# Patient Record
Sex: FEMALE | Race: WHITE | HISPANIC OR LATINO | Employment: FULL TIME | URBAN - METROPOLITAN AREA
[De-identification: names, ages, dates, MRNs, and addresses within clinical notes are randomized per-mention and may not be internally consistent; named-entity substitution may affect disease eponyms.]

---

## 2022-06-29 PROBLEM — M17.9 OSTEOARTHRITIS OF KNEE: Status: ACTIVE | Noted: 2022-06-29

## 2024-01-04 DIAGNOSIS — Z00.00 WELLNESS EXAMINATION: ICD-10-CM

## 2024-01-04 DIAGNOSIS — E53.8 B12 DEFICIENCY: ICD-10-CM

## 2024-01-04 DIAGNOSIS — E03.9 HYPOTHYROIDISM, UNSPECIFIED TYPE: ICD-10-CM

## 2024-01-04 DIAGNOSIS — I10 PRIMARY HYPERTENSION: Primary | ICD-10-CM

## 2024-01-05 ENCOUNTER — HOSPITAL ENCOUNTER (OUTPATIENT)
Dept: CARDIOLOGY | Facility: HOSPITAL | Age: 69
Discharge: HOME | End: 2024-01-05
Attending: INTERNAL MEDICINE
Payer: MEDICARE

## 2024-01-05 DIAGNOSIS — G45.3 AMAUROSIS FUGAX: ICD-10-CM

## 2024-01-05 LAB
AV INDEX (PROSTH): 0.55
AV MEAN GRADIENT: 5 MMHG
AV PEAK GRADIENT: 11 MMHG
AV VALVE AREA BY VELOCITY RATIO: 1.63 CM²
AV VALVE AREA: 1.57 CM²
AV VELOCITY RATIO: 0.58
CV ECHO LV RWT: 0.58 CM
DOP CALC AO PEAK VEL: 1.63 M/S
DOP CALC AO VTI: 38.2 CM
DOP CALC LVOT AREA: 2.8 CM2
DOP CALC LVOT DIAMETER: 1.9 CM
DOP CALC LVOT PEAK VEL: 0.94 M/S
DOP CALC LVOT STROKE VOLUME: 59.79 CM3
DOP CALC MV VTI: 34.5 CM
DOP CALCLVOT PEAK VEL VTI: 21.1 CM
E/A RATIO: 2.24
E/E' RATIO: 9.18 M/S
ECHO LV POSTERIOR WALL: 1.18 CM (ref 0.6–1.1)
FRACTIONAL SHORTENING: 29 % (ref 28–44)
INTERVENTRICULAR SEPTUM: 0.99 CM (ref 0.6–1.1)
LEFT ATRIUM SIZE: 4.2 CM
LEFT CCA DIST DIAS: 17 CM/S
LEFT CCA DIST SYS: 80 CM/S
LEFT CCA PROX DIAS: 22 CM/S
LEFT CCA PROX SYS: 140 CM/S
LEFT ECA DIAS: 7 CM/S
LEFT ECA SYS: 75 CM/S
LEFT ICA DIST DIAS: 28 CM/S
LEFT ICA DIST SYS: 80 CM/S
LEFT ICA MID DIAS: 27 CM/S
LEFT ICA MID SYS: 73 CM/S
LEFT ICA PROX DIAS: 22 CM/S
LEFT ICA PROX SYS: 69 CM/S
LEFT INTERNAL DIMENSION IN SYSTOLE: 2.9 CM (ref 2.1–4)
LEFT VENTRICLE DIASTOLIC VOLUME: 73.4 ML
LEFT VENTRICLE SYSTOLIC VOLUME: 32.2 ML
LEFT VENTRICULAR INTERNAL DIMENSION IN DIASTOLE: 4.08 CM (ref 3.5–6)
LEFT VENTRICULAR MASS: 147.22 G
LEFT VERTEBRAL DIAS: 13 CM/S
LEFT VERTEBRAL SYS: 46 CM/S
LV LATERAL E/E' RATIO: 8.42 M/S
LV SEPTAL E/E' RATIO: 10.1 M/S
LVOT MG: 2 MMHG
LVOT MV: 0.66 CM/S
MV MEAN GRADIENT: 2 MMHG
MV PEAK A VEL: 0.45 M/S
MV PEAK E VEL: 1.01 M/S
MV PEAK GRADIENT: 4 MMHG
MV STENOSIS PRESSURE HALF TIME: 77 MS
MV VALVE AREA BY CONTINUITY EQUATION: 1.73 CM2
MV VALVE AREA P 1/2 METHOD: 2.86 CM2
OHS CV CAROTID RIGHT ICA EDV HIGHEST: 31
OHS CV CAROTID ULTRASOUND LEFT ICA/CCA RATIO: 1
OHS CV CAROTID ULTRASOUND RIGHT ICA/CCA RATIO: 1.13
OHS CV PV CAROTID LEFT HIGHEST CCA: 140
OHS CV PV CAROTID LEFT HIGHEST ICA: 80
OHS CV PV CAROTID RIGHT HIGHEST CCA: 120
OHS CV PV CAROTID RIGHT HIGHEST ICA: 99
OHS CV US CAROTID LEFT HIGHEST EDV: 28
OHS LV EJECTION FRACTION SIMPSONS BIPLANE MOD: 64 %
PISA MRMAX VEL: 4.67 M/S
PISA TR MAX VEL: 2.28 M/S
PV PEAK GRADIENT: 4 MMHG
PV PEAK VELOCITY: 0.95 M/S
RA PRESSURE ESTIMATED: 3 MMHG
RIGHT CCA DIST DIAS: 19 CM/S
RIGHT CCA DIST SYS: 88 CM/S
RIGHT CCA PROX DIAS: 19 CM/S
RIGHT CCA PROX SYS: 120 CM/S
RIGHT ECA DIAS: 10 CM/S
RIGHT ECA SYS: 75 CM/S
RIGHT ICA DIST DIAS: 31 CM/S
RIGHT ICA DIST SYS: 99 CM/S
RIGHT ICA MID DIAS: 18 CM/S
RIGHT ICA MID SYS: 70 CM/S
RIGHT ICA PROX DIAS: 23 CM/S
RIGHT ICA PROX SYS: 89 CM/S
RIGHT VENTRICULAR END-DIASTOLIC DIMENSION: 2.41 CM
RIGHT VERTEBRAL DIAS: 0 CM/S
RIGHT VERTEBRAL SYS: 50 CM/S
RV TB RVSP: 5 MMHG
TDI LATERAL: 0.12 M/S
TDI SEPTAL: 0.1 M/S
TDI: 0.11 M/S
TR MAX PG: 21 MMHG
TRICUSPID ANNULAR PLANE SYSTOLIC EXCURSION: 2.19 CM
TV REST PULMONARY ARTERY PRESSURE: 24 MMHG

## 2024-01-05 PROCEDURE — 93880 EXTRACRANIAL BILAT STUDY: CPT | Mod: 26,,, | Performed by: SPECIALIST

## 2024-01-05 PROCEDURE — 93306 TTE W/DOPPLER COMPLETE: CPT | Mod: 26,,, | Performed by: INTERNAL MEDICINE

## 2024-01-05 PROCEDURE — 93880 EXTRACRANIAL BILAT STUDY: CPT

## 2024-01-05 PROCEDURE — 93306 TTE W/DOPPLER COMPLETE: CPT

## 2024-01-09 ENCOUNTER — TELEPHONE (OUTPATIENT)
Dept: INTERNAL MEDICINE | Facility: CLINIC | Age: 69
End: 2024-01-09
Payer: MEDICARE

## 2024-01-09 NOTE — TELEPHONE ENCOUNTER
"----- Message from Rissa Garrido LPN sent at 1/8/2024  7:44 AM CST -----  Regarding: Dr. Roberts 01/16/2024 1:00pm Wellness  Are there any outstanding tasks in chart? No, but needs FASTING labs, TO BE DONE AT  "[facility]" or lab location of choice PRIOR to appt    Is there any documentation of tasks? no    Has the pt seen another physician, been to ER, C, or admitted to hospital since last visit?    Has the pt done blood work or imaging since last visit?    5. PLEASE HAVE PATIENT BRING MEDICATION LIST OR BOTTLES TO EVERY OFFICE VISIT      "

## 2024-01-11 ENCOUNTER — TELEPHONE (OUTPATIENT)
Dept: INTERNAL MEDICINE | Facility: CLINIC | Age: 69
End: 2024-01-11
Payer: MEDICARE

## 2024-01-11 ENCOUNTER — PATIENT MESSAGE (OUTPATIENT)
Dept: INTERNAL MEDICINE | Facility: CLINIC | Age: 69
End: 2024-01-11
Payer: MEDICARE

## 2024-01-11 NOTE — TELEPHONE ENCOUNTER
----- Message from Radha Forde sent at 1/11/2024  2:18 PM CST -----  .Caller is requesting to schedule their Lab appointment prior to annual appointment.  Order is not listed in EPIC.  Please enter order and contact patient to schedule.    Name of Caller:pt    Preferred Date and Time of Labs:    Date of EPP Appointment:01/17/2024    Where would they like the lab performed?[facility]    Would the patient rather a call back or a response via [facility]? nica    Best Call Back Number:[phone]    Additional Information:making sure labs in epic

## 2024-01-12 ENCOUNTER — LAB VISIT (OUTPATIENT)
Dept: LAB | Facility: HOSPITAL | Age: 69
End: 2024-01-12
Attending: INTERNAL MEDICINE
Payer: MEDICARE

## 2024-01-12 DIAGNOSIS — E53.8 B12 DEFICIENCY: ICD-10-CM

## 2024-01-12 DIAGNOSIS — Z00.00 WELLNESS EXAMINATION: ICD-10-CM

## 2024-01-12 DIAGNOSIS — I10 PRIMARY HYPERTENSION: ICD-10-CM

## 2024-01-12 DIAGNOSIS — E03.9 HYPOTHYROIDISM, UNSPECIFIED TYPE: ICD-10-CM

## 2024-01-12 LAB
ALBUMIN SERPL-MCNC: 4.1 G/DL (ref 3.4–4.8)
ALBUMIN/GLOB SERPL: 1.2 RATIO (ref 1.1–2)
ALP SERPL-CCNC: 66 UNIT/L (ref 40–150)
ALT SERPL-CCNC: 11 UNIT/L (ref 0–55)
AST SERPL-CCNC: 16 UNIT/L (ref 5–34)
BASOPHILS # BLD AUTO: 0.08 X10(3)/MCL
BASOPHILS NFR BLD AUTO: 1.6 %
BILIRUB SERPL-MCNC: 0.4 MG/DL
BUN SERPL-MCNC: 17 MG/DL (ref 9.8–20.1)
CALCIUM SERPL-MCNC: 9.9 MG/DL (ref 8.4–10.2)
CHLORIDE SERPL-SCNC: 101 MMOL/L (ref 98–107)
CHOLEST SERPL-MCNC: 191 MG/DL
CHOLEST/HDLC SERPL: 2 {RATIO} (ref 0–5)
CO2 SERPL-SCNC: 25 MMOL/L (ref 23–31)
CREAT SERPL-MCNC: 0.71 MG/DL (ref 0.55–1.02)
EOSINOPHIL # BLD AUTO: 0.17 X10(3)/MCL (ref 0–0.9)
EOSINOPHIL NFR BLD AUTO: 3.5 %
ERYTHROCYTE [DISTWIDTH] IN BLOOD BY AUTOMATED COUNT: 12.9 % (ref 11.5–17)
GFR SERPLBLD CREATININE-BSD FMLA CKD-EPI: >60 MLS/MIN/1.73/M2
GLOBULIN SER-MCNC: 3.3 GM/DL (ref 2.4–3.5)
GLUCOSE SERPL-MCNC: 97 MG/DL (ref 82–115)
HCT VFR BLD AUTO: 38.3 % (ref 37–47)
HDLC SERPL-MCNC: 103 MG/DL (ref 35–60)
HGB BLD-MCNC: 12.3 G/DL (ref 12–16)
IMM GRANULOCYTES # BLD AUTO: 0.01 X10(3)/MCL (ref 0–0.04)
IMM GRANULOCYTES NFR BLD AUTO: 0.2 %
LDLC SERPL CALC-MCNC: 80 MG/DL (ref 50–140)
LYMPHOCYTES # BLD AUTO: 1.9 X10(3)/MCL (ref 0.6–4.6)
LYMPHOCYTES NFR BLD AUTO: 38.7 %
MCH RBC QN AUTO: 30.2 PG (ref 27–31)
MCHC RBC AUTO-ENTMCNC: 32.1 G/DL (ref 33–36)
MCV RBC AUTO: 94.1 FL (ref 80–94)
MONOCYTES # BLD AUTO: 0.59 X10(3)/MCL (ref 0.1–1.3)
MONOCYTES NFR BLD AUTO: 12 %
NEUTROPHILS # BLD AUTO: 2.16 X10(3)/MCL (ref 2.1–9.2)
NEUTROPHILS NFR BLD AUTO: 44 %
NRBC BLD AUTO-RTO: 0 %
PLATELET # BLD AUTO: 247 X10(3)/MCL (ref 130–400)
PMV BLD AUTO: 9.7 FL (ref 7.4–10.4)
POTASSIUM SERPL-SCNC: 4.2 MMOL/L (ref 3.5–5.1)
PROT SERPL-MCNC: 7.4 GM/DL (ref 5.8–7.6)
RBC # BLD AUTO: 4.07 X10(6)/MCL (ref 4.2–5.4)
SODIUM SERPL-SCNC: 135 MMOL/L (ref 136–145)
TRIGL SERPL-MCNC: 42 MG/DL (ref 37–140)
TSH SERPL-ACNC: 1.34 UIU/ML (ref 0.35–4.94)
VIT B12 SERPL-MCNC: 764 PG/ML (ref 213–816)
VLDLC SERPL CALC-MCNC: 8 MG/DL
WBC # SPEC AUTO: 4.91 X10(3)/MCL (ref 4.5–11.5)

## 2024-01-12 PROCEDURE — 80053 COMPREHEN METABOLIC PANEL: CPT

## 2024-01-12 PROCEDURE — 80061 LIPID PANEL: CPT

## 2024-01-12 PROCEDURE — 36415 COLL VENOUS BLD VENIPUNCTURE: CPT

## 2024-01-12 PROCEDURE — 85025 COMPLETE CBC W/AUTO DIFF WBC: CPT

## 2024-01-12 PROCEDURE — 82607 VITAMIN B-12: CPT

## 2024-01-12 PROCEDURE — 84443 ASSAY THYROID STIM HORMONE: CPT

## 2024-01-17 ENCOUNTER — TELEPHONE (OUTPATIENT)
Dept: INTERNAL MEDICINE | Facility: CLINIC | Age: 69
End: 2024-01-17

## 2024-01-17 ENCOUNTER — OFFICE VISIT (OUTPATIENT)
Dept: INTERNAL MEDICINE | Facility: CLINIC | Age: 69
End: 2024-01-17
Payer: MEDICARE

## 2024-01-17 VITALS
BODY MASS INDEX: 25.1 KG/M2 | HEIGHT: 62 IN | DIASTOLIC BLOOD PRESSURE: 60 MMHG | HEART RATE: 83 BPM | SYSTOLIC BLOOD PRESSURE: 116 MMHG | WEIGHT: 136.38 LBS | OXYGEN SATURATION: 97 %

## 2024-01-17 DIAGNOSIS — M85.80 OSTEOPENIA, UNSPECIFIED LOCATION: ICD-10-CM

## 2024-01-17 DIAGNOSIS — E53.8 B12 DEFICIENCY: ICD-10-CM

## 2024-01-17 DIAGNOSIS — G47.00 INSOMNIA, UNSPECIFIED TYPE: Primary | ICD-10-CM

## 2024-01-17 DIAGNOSIS — E03.9 HYPOTHYROIDISM, UNSPECIFIED TYPE: ICD-10-CM

## 2024-01-17 PROCEDURE — 96372 THER/PROPH/DIAG INJ SC/IM: CPT | Mod: ,,, | Performed by: INTERNAL MEDICINE

## 2024-01-17 PROCEDURE — 99214 OFFICE O/P EST MOD 30 MIN: CPT | Mod: 25,,, | Performed by: INTERNAL MEDICINE

## 2024-01-17 RX ORDER — CYANOCOBALAMIN 1000 UG/ML
1000 INJECTION, SOLUTION INTRAMUSCULAR; SUBCUTANEOUS
Status: COMPLETED | OUTPATIENT
Start: 2024-01-17 | End: 2024-01-17

## 2024-01-17 RX ADMIN — CYANOCOBALAMIN 1000 MCG: 1000 INJECTION, SOLUTION INTRAMUSCULAR; SUBCUTANEOUS at 02:01

## 2024-01-17 NOTE — PROGRESS NOTES
"Patient ID: Cira Lagunas is a 68 y.o. female.  Chief Complaint: Follow-up (4 month )    HPI:     67 yo  with hypothyroidism , here to discuss ECHO  and  Carotid s    Current Outpatient Medications:     calcium carbonate (OS-NABIL) 600 mg calcium (1,500 mg) Tab, Take 600 mg by mouth once., Disp: , Rfl:     ibuprofen (ADVIL,MOTRIN) 800 MG tablet, Take 1 tablet (800 mg total) by mouth 3 (three) times daily., Disp: 30 tablet, Rfl: 0    levothyroxine (SYNTHROID) 25 MCG tablet, TAKE 1 TABLET BY MOUTH EVERY DAY, Disp: 90 tablet, Rfl: 2    losartan (COZAAR) 25 MG tablet, TAKE 1 TABLET BY MOUTH EVERY DAY, Disp: 90 tablet, Rfl: 2    minoxidiL (LONITEN) 2.5 MG tablet, Take by mouth., Disp: , Rfl:     multivitamin-folic acid-biotin (HAIR-SKIN-NAILS, MV-FA-BIOTIN,) 400-2,000 mcg Tab, Take by mouth., Disp: , Rfl:   ROS:   Constitutional: No weight gain, No fever, No chills, No fatigue.   Eyes: No blurring, No visual disturbances.   Ear/Nose/Mouth/Throat: No decreased hearing, No ear pain, No nasal congestion, No sore throat.   Respiratory: No shortness of breath, No cough, No wheezing.   Cardiovascular: No chest pain, No palpitations, No peripheral edema.   Gastrointestinal: No nausea, No vomiting, No diarrhea, No constipation, No abdominal pain.   Genitourinary: No dysuria, No hematuria.   Hematology/Lymphatics: No bruising tendency, No bleeding tendency, No swollen lymph glands.   Endocrine: No excessive thirst, No polyuria, No excessive hunger.   Musculoskeletal: No joint pain, No muscle pain, No decreased range of motion.   Integumentary: No rash, No pruritus.   Neurologic: No abnormal balance, No confusion, No headache.   Psychiatric: No anxiety, No depression, Not suicidal, No hallucinations.    PE/Vitals:   /60 (BP Location: Left arm, Patient Position: Sitting, BP Method: Small (Manual))   Pulse 83   Ht 5' 2" (1.575 m)   Wt 61.9 kg (136 lb 6.4 oz)   SpO2 97%   BMI 24.95 kg/m²   General: Alert and oriented, No " acute distress.   Eye: Normal conjunctiva without exudate.  HENMT: Normocephalic/AT, Normal hearing, Oral mucosa is moist and pink   Neck: No goiter visualized.   Respiratory: Lungs CTAB, Respirations are non-labored, Breath sounds are equal, Symmetrical chest wall expansion.  Cardiovascular: Normal rate, Regular rhythm, No murmur, No edema.   Gastrointestinal: Non-distended.   Genitourinary: Deferred.  Musculoskeletal: Normal ROM, Normal gait, No deformities or amputations.  Integumentary: Warm, Dry, Intact. No diaphoresis, or flushing.  Neurologic: No focal deficits, Cranial Nerves II-XII are grossly intact.   Psychiatric: Cooperative, Appropriate mood & affect, Normal judgment, Non-suicidal.  Assessment/Plan   1. Insomnia, unspecified type    2. Hypothyroidism, unspecified type    3. Osteopenia, unspecified location      No orders of the defined types were placed in this encounter.    Education and counseling done face to face regarding medical conditions and plan. Contact office if new symptoms develop. Should any symptoms ever significantly worsen seek emergency medical attention/go to ER. Follow up at least yearly for wellness or sooner PRN. Nurse to call patient with any results. The patient is receptive, expresses understanding and is agreeable to plan. All questions have been answered.  Follow up in about 6 months (around 7/17/2024).

## 2024-02-06 DIAGNOSIS — I10 HYPERTENSION, UNSPECIFIED TYPE: ICD-10-CM

## 2024-02-06 RX ORDER — LOSARTAN POTASSIUM 25 MG/1
TABLET ORAL
Qty: 90 TABLET | Refills: 2 | Status: SHIPPED | OUTPATIENT
Start: 2024-02-06

## 2024-02-26 ENCOUNTER — PATIENT MESSAGE (OUTPATIENT)
Dept: INTERNAL MEDICINE | Facility: CLINIC | Age: 69
End: 2024-02-26
Payer: MEDICARE

## 2024-02-29 ENCOUNTER — CLINICAL SUPPORT (OUTPATIENT)
Dept: INTERNAL MEDICINE | Facility: CLINIC | Age: 69
End: 2024-02-29
Payer: MEDICARE

## 2024-02-29 DIAGNOSIS — E53.8 B12 DEFICIENCY: Primary | ICD-10-CM

## 2024-02-29 PROCEDURE — 96372 THER/PROPH/DIAG INJ SC/IM: CPT | Mod: ,,, | Performed by: INTERNAL MEDICINE

## 2024-02-29 RX ORDER — CYANOCOBALAMIN 1000 UG/ML
1000 INJECTION, SOLUTION INTRAMUSCULAR; SUBCUTANEOUS
Status: COMPLETED | OUTPATIENT
Start: 2024-02-29 | End: 2024-02-29

## 2024-02-29 RX ADMIN — CYANOCOBALAMIN 1000 MCG: 1000 INJECTION, SOLUTION INTRAMUSCULAR; SUBCUTANEOUS at 08:02

## 2024-03-28 ENCOUNTER — CLINICAL SUPPORT (OUTPATIENT)
Dept: INTERNAL MEDICINE | Facility: CLINIC | Age: 69
End: 2024-03-28
Payer: MEDICARE

## 2024-03-28 DIAGNOSIS — E53.8 B12 DEFICIENCY: Primary | ICD-10-CM

## 2024-03-28 PROCEDURE — 96372 THER/PROPH/DIAG INJ SC/IM: CPT | Mod: ,,, | Performed by: INTERNAL MEDICINE

## 2024-03-28 RX ORDER — CYANOCOBALAMIN 1000 UG/ML
1000 INJECTION, SOLUTION INTRAMUSCULAR; SUBCUTANEOUS
Status: COMPLETED | OUTPATIENT
Start: 2024-03-28 | End: 2024-03-28

## 2024-03-28 RX ADMIN — CYANOCOBALAMIN 1000 MCG: 1000 INJECTION, SOLUTION INTRAMUSCULAR; SUBCUTANEOUS at 08:03

## 2024-03-28 NOTE — PROGRESS NOTES
Patient here for nurse visit to receive B12 injection. Injection given in left ventrogluteal. Patient tolerated well.

## 2024-04-25 ENCOUNTER — CLINICAL SUPPORT (OUTPATIENT)
Dept: INTERNAL MEDICINE | Facility: CLINIC | Age: 69
End: 2024-04-25
Payer: MEDICARE

## 2024-04-25 DIAGNOSIS — E53.8 B12 DEFICIENCY: Primary | ICD-10-CM

## 2024-04-25 PROCEDURE — 96372 THER/PROPH/DIAG INJ SC/IM: CPT | Mod: ,,, | Performed by: INTERNAL MEDICINE

## 2024-04-25 RX ORDER — CYANOCOBALAMIN 1000 UG/ML
1000 INJECTION, SOLUTION INTRAMUSCULAR; SUBCUTANEOUS
Status: COMPLETED | OUTPATIENT
Start: 2024-04-25 | End: 2024-04-25

## 2024-04-25 RX ADMIN — CYANOCOBALAMIN 1000 MCG: 1000 INJECTION, SOLUTION INTRAMUSCULAR; SUBCUTANEOUS at 07:04

## 2024-04-25 NOTE — PROGRESS NOTES
Patient here for nurse visit to receive B12 injection. Injection given in left ventroglueteal. Patient tolerated well.

## 2024-05-21 ENCOUNTER — TELEPHONE (OUTPATIENT)
Dept: INTERNAL MEDICINE | Facility: CLINIC | Age: 69
End: 2024-05-21
Payer: MEDICARE

## 2024-05-21 NOTE — TELEPHONE ENCOUNTER
----- Message from [facility] sent at 5/21/2024  3:19 PM CDT -----  .Type:  Needs Medical Advice    Who Called:  pt    Symptoms (please be specific):  no     How long has patient had these symptoms:   no    Pharmacy name and phone #:   no    Would the patient rather a call back or a response via [facility]?      Best Call Back Number:  [phone]    Additional Information:  pt needs to reschedule her nurse visit for her B-12 INJECTION please advise thanks

## 2024-05-22 ENCOUNTER — CLINICAL SUPPORT (OUTPATIENT)
Dept: INTERNAL MEDICINE | Facility: CLINIC | Age: 69
End: 2024-05-22
Payer: MEDICARE

## 2024-05-22 DIAGNOSIS — E53.8 B12 DEFICIENCY: Primary | ICD-10-CM

## 2024-05-22 PROCEDURE — 96372 THER/PROPH/DIAG INJ SC/IM: CPT | Mod: ,,, | Performed by: INTERNAL MEDICINE

## 2024-05-22 RX ORDER — CYANOCOBALAMIN 1000 UG/ML
1000 INJECTION, SOLUTION INTRAMUSCULAR; SUBCUTANEOUS
Status: COMPLETED | OUTPATIENT
Start: 2024-05-22 | End: 2024-05-22

## 2024-05-22 RX ADMIN — CYANOCOBALAMIN 1000 MCG: 1000 INJECTION, SOLUTION INTRAMUSCULAR; SUBCUTANEOUS at 08:05

## 2024-06-24 ENCOUNTER — CLINICAL SUPPORT (OUTPATIENT)
Dept: INTERNAL MEDICINE | Facility: CLINIC | Age: 69
End: 2024-06-24
Payer: MEDICARE

## 2024-06-24 DIAGNOSIS — E53.8 B12 DEFICIENCY: Primary | ICD-10-CM

## 2024-06-24 PROCEDURE — 96372 THER/PROPH/DIAG INJ SC/IM: CPT | Mod: ,,, | Performed by: INTERNAL MEDICINE

## 2024-06-24 RX ORDER — CYANOCOBALAMIN 1000 UG/ML
1000 INJECTION, SOLUTION INTRAMUSCULAR; SUBCUTANEOUS
Status: COMPLETED | OUTPATIENT
Start: 2024-06-24 | End: 2024-06-24

## 2024-06-24 RX ADMIN — CYANOCOBALAMIN 1000 MCG: 1000 INJECTION, SOLUTION INTRAMUSCULAR; SUBCUTANEOUS at 08:06

## 2024-06-24 NOTE — PROGRESS NOTES
Patient here for nurse visit to receive B12 injection. Injection given in right ventrogluteal Patient tolerated well.

## 2024-07-03 ENCOUNTER — TELEPHONE (OUTPATIENT)
Dept: INTERNAL MEDICINE | Facility: CLINIC | Age: 69
End: 2024-07-03
Payer: MEDICARE

## 2024-07-03 NOTE — TELEPHONE ENCOUNTER
----- Message from Radha Forde sent at 7/3/2024  9:58 AM CDT -----  .Who Called: Collette Lagunas          Patient's Preferred Phone Number on File: [phone]   Best Call Back Number, if different:  Additional Information: Karine calling for sx for ENT status

## 2024-07-15 DIAGNOSIS — I10 PRIMARY HYPERTENSION: Primary | ICD-10-CM

## 2024-07-15 DIAGNOSIS — Z00.00 WELLNESS EXAMINATION: ICD-10-CM

## 2024-07-15 DIAGNOSIS — E03.8 HYPOTHYROIDISM DUE TO HASHIMOTO'S THYROIDITIS: ICD-10-CM

## 2024-07-15 DIAGNOSIS — E06.3 HYPOTHYROIDISM DUE TO HASHIMOTO'S THYROIDITIS: ICD-10-CM

## 2024-07-16 ENCOUNTER — TELEPHONE (OUTPATIENT)
Dept: INTERNAL MEDICINE | Facility: CLINIC | Age: 69
End: 2024-07-16
Payer: MEDICARE

## 2024-07-16 NOTE — TELEPHONE ENCOUNTER
"----- Message from Rissa Garrido LPN sent at 7/15/2024  9:39 AM CDT -----  Regarding: Dr. Roberts 07/23/2024 Wellness 1000 CHANGE TO WELLNESS  Are there any outstanding tasks in chart? No, but needs FASTING labs, TO BE DONE AT  "[facility]" or lab location of choice PRIOR to appt    Is there any documentation of tasks? no    Has the pt seen another physician, been to ER, [facility], or admitted to hospital since last visit?    Has the pt done blood work or imaging since last visit?    5. PLEASE HAVE PATIENT BRING MEDICATION LIST OR BOTTLES TO EVERY OFFICE VISIT  "

## 2024-07-18 ENCOUNTER — LAB VISIT (OUTPATIENT)
Dept: LAB | Facility: HOSPITAL | Age: 69
End: 2024-07-18
Attending: INTERNAL MEDICINE
Payer: MEDICARE

## 2024-07-18 DIAGNOSIS — Z00.00 WELLNESS EXAMINATION: ICD-10-CM

## 2024-07-18 DIAGNOSIS — E03.8 HYPOTHYROIDISM DUE TO HASHIMOTO'S THYROIDITIS: ICD-10-CM

## 2024-07-18 DIAGNOSIS — I10 PRIMARY HYPERTENSION: ICD-10-CM

## 2024-07-18 DIAGNOSIS — E06.3 HYPOTHYROIDISM DUE TO HASHIMOTO'S THYROIDITIS: ICD-10-CM

## 2024-07-18 LAB
ALBUMIN SERPL-MCNC: 4.2 G/DL (ref 3.4–4.8)
ALBUMIN/GLOB SERPL: 1.3 RATIO (ref 1.1–2)
ALP SERPL-CCNC: 61 UNIT/L (ref 40–150)
ALT SERPL-CCNC: 11 UNIT/L (ref 0–55)
ANION GAP SERPL CALC-SCNC: 9 MEQ/L
AST SERPL-CCNC: 17 UNIT/L (ref 5–34)
BASOPHILS # BLD AUTO: 0.04 X10(3)/MCL
BASOPHILS NFR BLD AUTO: 0.9 %
BILIRUB SERPL-MCNC: 0.6 MG/DL
BUN SERPL-MCNC: 15.9 MG/DL (ref 9.8–20.1)
CALCIUM SERPL-MCNC: 9.7 MG/DL (ref 8.4–10.2)
CHLORIDE SERPL-SCNC: 101 MMOL/L (ref 98–107)
CHOLEST SERPL-MCNC: 197 MG/DL
CHOLEST/HDLC SERPL: 2 {RATIO} (ref 0–5)
CO2 SERPL-SCNC: 24 MMOL/L (ref 23–31)
CREAT SERPL-MCNC: 0.77 MG/DL (ref 0.55–1.02)
CREAT/UREA NIT SERPL: 21
EOSINOPHIL # BLD AUTO: 0.12 X10(3)/MCL (ref 0–0.9)
EOSINOPHIL NFR BLD AUTO: 2.8 %
ERYTHROCYTE [DISTWIDTH] IN BLOOD BY AUTOMATED COUNT: 12.6 % (ref 11.5–17)
GFR SERPLBLD CREATININE-BSD FMLA CKD-EPI: >60 ML/MIN/1.73/M2
GLOBULIN SER-MCNC: 3.2 GM/DL (ref 2.4–3.5)
GLUCOSE SERPL-MCNC: 102 MG/DL (ref 82–115)
HCT VFR BLD AUTO: 37.8 % (ref 37–47)
HDLC SERPL-MCNC: 96 MG/DL (ref 35–60)
HGB BLD-MCNC: 12.4 G/DL (ref 12–16)
IMM GRANULOCYTES # BLD AUTO: 0.01 X10(3)/MCL (ref 0–0.04)
IMM GRANULOCYTES NFR BLD AUTO: 0.2 %
LDLC SERPL CALC-MCNC: 92 MG/DL (ref 50–140)
LYMPHOCYTES # BLD AUTO: 1.29 X10(3)/MCL (ref 0.6–4.6)
LYMPHOCYTES NFR BLD AUTO: 30.2 %
MCH RBC QN AUTO: 30 PG (ref 27–31)
MCHC RBC AUTO-ENTMCNC: 32.8 G/DL (ref 33–36)
MCV RBC AUTO: 91.3 FL (ref 80–94)
MONOCYTES # BLD AUTO: 0.51 X10(3)/MCL (ref 0.1–1.3)
MONOCYTES NFR BLD AUTO: 11.9 %
NEUTROPHILS # BLD AUTO: 2.3 X10(3)/MCL (ref 2.1–9.2)
NEUTROPHILS NFR BLD AUTO: 54 %
NRBC BLD AUTO-RTO: 0 %
PLATELET # BLD AUTO: 215 X10(3)/MCL (ref 130–400)
PMV BLD AUTO: 9.6 FL (ref 7.4–10.4)
POTASSIUM SERPL-SCNC: 4.3 MMOL/L (ref 3.5–5.1)
PROT SERPL-MCNC: 7.4 GM/DL (ref 5.8–7.6)
RBC # BLD AUTO: 4.14 X10(6)/MCL (ref 4.2–5.4)
SODIUM SERPL-SCNC: 134 MMOL/L (ref 136–145)
TRIGL SERPL-MCNC: 44 MG/DL (ref 37–140)
TSH SERPL-ACNC: 1.42 UIU/ML (ref 0.35–4.94)
VLDLC SERPL CALC-MCNC: 9 MG/DL
WBC # BLD AUTO: 4.27 X10(3)/MCL (ref 4.5–11.5)

## 2024-07-18 PROCEDURE — 85025 COMPLETE CBC W/AUTO DIFF WBC: CPT

## 2024-07-18 PROCEDURE — 80061 LIPID PANEL: CPT | Mod: GA

## 2024-07-18 PROCEDURE — 84443 ASSAY THYROID STIM HORMONE: CPT

## 2024-07-18 PROCEDURE — 80053 COMPREHEN METABOLIC PANEL: CPT

## 2024-07-18 PROCEDURE — 36415 COLL VENOUS BLD VENIPUNCTURE: CPT

## 2024-07-23 ENCOUNTER — OFFICE VISIT (OUTPATIENT)
Dept: INTERNAL MEDICINE | Facility: CLINIC | Age: 69
End: 2024-07-23
Payer: MEDICARE

## 2024-07-23 ENCOUNTER — PATIENT MESSAGE (OUTPATIENT)
Dept: INTERNAL MEDICINE | Facility: CLINIC | Age: 69
End: 2024-07-23

## 2024-07-23 VITALS
WEIGHT: 137 LBS | HEIGHT: 62 IN | DIASTOLIC BLOOD PRESSURE: 60 MMHG | OXYGEN SATURATION: 98 % | HEART RATE: 85 BPM | SYSTOLIC BLOOD PRESSURE: 114 MMHG | BODY MASS INDEX: 25.21 KG/M2

## 2024-07-23 DIAGNOSIS — E53.8 B12 DEFICIENCY: Primary | ICD-10-CM

## 2024-07-23 DIAGNOSIS — E03.9 HYPOTHYROIDISM, UNSPECIFIED TYPE: ICD-10-CM

## 2024-07-23 DIAGNOSIS — I10 PRIMARY HYPERTENSION: ICD-10-CM

## 2024-07-23 DIAGNOSIS — Z00.00 MEDICARE ANNUAL WELLNESS VISIT, SUBSEQUENT: ICD-10-CM

## 2024-07-23 PROCEDURE — G0439 PPPS, SUBSEQ VISIT: HCPCS | Mod: ,,, | Performed by: INTERNAL MEDICINE

## 2024-07-23 PROCEDURE — 96372 THER/PROPH/DIAG INJ SC/IM: CPT | Mod: ,,, | Performed by: INTERNAL MEDICINE

## 2024-07-23 RX ORDER — CYANOCOBALAMIN 1000 UG/ML
1000 INJECTION, SOLUTION INTRAMUSCULAR; SUBCUTANEOUS
Status: COMPLETED | OUTPATIENT
Start: 2024-07-23 | End: 2024-07-23

## 2024-07-23 RX ADMIN — CYANOCOBALAMIN 1000 MCG: 1000 INJECTION, SOLUTION INTRAMUSCULAR; SUBCUTANEOUS at 10:07

## 2024-07-23 NOTE — PROGRESS NOTES
Patient ID: Collette Lagunas is a 69 y.o. female.    Chief Complaint: Medicare AWV      Patient Care Team:  Joel Roberts MD as PCP - General (Internal Medicine)     HPI:   Patient presents here today for above reason.     Patient is without any major complaints or concerns at this time.     The patient's Health Maintenance was reviewed and the following appears to be due at this time:   Health Maintenance Due   Topic Date Due    Hepatitis C Screening  Never done    RSV Vaccine (Age 60+ and Pregnant patients) (1 - 1-dose 60+ series) Never done    COVID-19 Vaccine (5 - 2023-24 season) 09/01/2023        Past Medical History:  Past Medical History:   Diagnosis Date    Degenerative joint disease of knee     Fatigue     HTN (hypertension)     Hypothyroidism, unspecified     Osteopenia     Personal history of colonic polyps     Vitamin B deficiency      Past Surgical History:   Procedure Laterality Date    COLONOSCOPY  10/13/2020    Dr Roni Brown    EYE SURGERY  2004    Lasik surgery    JOINT REPLACEMENT  February 2021    Full knee replacement     Review of patient's allergies indicates:   Allergen Reactions    Sulfa (sulfonamide antibiotics)      Other reaction(s): Unknown     Current Outpatient Medications on File Prior to Visit   Medication Sig Dispense Refill    calcium carbonate (OS-NABIL) 600 mg calcium (1,500 mg) Tab Take 600 mg by mouth once.      ibuprofen (ADVIL,MOTRIN) 800 MG tablet Take 1 tablet (800 mg total) by mouth 3 (three) times daily. 30 tablet 0    levothyroxine (SYNTHROID) 25 MCG tablet TAKE 1 TABLET BY MOUTH EVERY DAY 90 tablet 2    losartan (COZAAR) 25 MG tablet TAKE 1 TABLET BY MOUTH EVERY DAY 90 tablet 2    minoxidiL (LONITEN) 2.5 MG tablet Take by mouth.      multivitamin-folic acid-biotin (HAIR-SKIN-NAILS, MV-FA-BIOTIN,) 400-2,000 mcg Tab Take by mouth.       No current facility-administered medications on file prior to visit.     Social History     Socioeconomic History    Marital status:  "   Tobacco Use    Smoking status: Never    Smokeless tobacco: Never   Substance and Sexual Activity    Alcohol use: Never    Drug use: Never    Sexual activity: Yes     Partners: Male     Birth control/protection: None     Social Determinants of Health     Financial Resource Strain: Low Risk  (6/22/2024)    Overall Financial Resource Strain (CARDIA)     Difficulty of Paying Living Expenses: Not hard at all   Food Insecurity: No Food Insecurity (6/22/2024)    Hunger Vital Sign     Worried About Running Out of Food in the Last Year: Never true     Ran Out of Food in the Last Year: Never true   Physical Activity: Insufficiently Active (6/22/2024)    Exercise Vital Sign     Days of Exercise per Week: 5 days     Minutes of Exercise per Session: 10 min   Stress: Stress Concern Present (6/22/2024)    [employer] - Occupational Stress Questionnaire     Feeling of Stress : To some extent   Housing Stability: Unknown (6/22/2024)    Housing Stability Vital Sign     Unable to Pay for Housing in the Last Year: No     Family History   Problem Relation Name Age of Onset    Diabetes type II Father      Cancer Mother Collette Cooper         Breast Cancer    Hypertension Mother Collette Cooper     Cancer Son Joseph Lagunas         Ewings Sarcoma-skull                           Opioid Screening: Patient medication list reviewed, patient is not taking prescription opioids. Patient is not using additional opioids than prescribed. Patient is at low risk of substance abuse based on this opioid use history.      ROS:   Negative other than what is mentioned in HPI    Vitals/PE:   /60 (BP Location: Left arm, Patient Position: Sitting, BP Method: Small (Manual))   Pulse 85   Ht 5' 2" (1.575 m)   Wt 62.1 kg (137 lb)   SpO2 98%   BMI 25.06 kg/m²   Physical Exam    General: Alert and oriented, No acute distress.   Eye: Normal conjunctiva without exudate.  HENMT: Normocephalic/AT, Normal hearing, " Oral mucosa is moist and pink   Neck: No goiter visualized.   Respiratory: Lungs CTAB, Respirations are non-labored, Breath sounds are equal, Symmetrical chest wall expansion.  Cardiovascular: Normal rate, Regular rhythm, No murmur, No edema.   Gastrointestinal: Non-distended.   Genitourinary: Deferred.  Musculoskeletal: Normal ROM, Normal gait, No deformities or amputations.  Integumentary: Warm, Dry, Intact. No diaphoresis, or flushing.  Neurologic: No focal deficits, Cranial Nerves II-XII are grossly intact.   Psychiatric: Cooperative, Appropriate mood & affect, Normal judgment, Non-suicidal.           No data to display                  7/23/2024    10:00 AM 1/17/2024     2:00 PM 7/14/2023    11:00 AM 1/19/2023     9:20 AM 7/19/2022     9:00 AM 6/30/2022     9:40 AM 1/15/2019     1:45 PM   Fall Risk Assessment - Outpatient   Mobility Status Ambulatory Ambulatory Ambulatory Ambulatory Ambulatory Ambulatory Ambulatory   Number of falls 0 0 0 0 0 0 0   Identified as fall risk False False False False False False False                Assessment/Plan:    1. B12 deficiency  Comments:  got a shot today  Orders:  -     cyanocobalamin injection 1,000 mcg    2. Medicare annual wellness visit, subsequent  Comments:  all labs  discussed    3. Primary hypertension  Comments:  stable  on losartan    4. Hypothyroidism, unspecified type  Comments:  on synthroid        ..  Medications Ordered This Encounter   Medications    cyanocobalamin injection 1,000 mcg        ..No orders of the defined types were placed in this encounter.        I am having Cira Lagunas maintain her minoxidiL, calcium carbonate, HAIR-SKIN-NAILS (MV-FA-BIOTIN), ibuprofen, levothyroxine, and losartan. We administered cyanocobalamin.    No orders of the defined types were placed in this encounter.      Education and counseling done face to face regarding medical conditions and plan. Contact office if new symptoms develop. Should any symptoms ever  significantly worsen seek emergency medical attention/go to ER. Follow up at least yearly for wellness or sooner PRN. Nurse to call patient with any results. The patient is receptive, expresses understanding and is agreeable to plan. All questions have been answered.    Advance Care Planning     Date: 07/23/2024  Patient did not wish or was not able to name a surrogate decision maker or provide an Advance Care Plan.            No follow-ups on file.

## 2024-07-24 NOTE — TELEPHONE ENCOUNTER
Per Dr. Roberts--Dr. Roberts advised surgery is not until November, has to be completed #30 days prior to OV

## 2024-07-29 ENCOUNTER — TELEPHONE (OUTPATIENT)
Dept: INTERNAL MEDICINE | Facility: CLINIC | Age: 69
End: 2024-07-29
Payer: MEDICARE

## 2024-07-29 NOTE — TELEPHONE ENCOUNTER
----- Message from Fatemeh Soto sent at 7/26/2024 11:42 AM CDT -----  Who Called: Collette Lagunas    Caller is requesting assistance/information from provider's office.  Preferred Method of Contact: Phone Call  Patient's Preferred Phone Number on File: [phone]   Best Call Back Number, if different:  Additional Information:  medical advice, (petr) [phone] or fax [phone], called to request surgery clearance faxed back to her ASAP,(petr) stated she spoke to Maicol and that Maicol knows what this request is for please advise, thanks

## 2024-07-29 NOTE — TELEPHONE ENCOUNTER
Surgery is not scheduled until November 4, 2024   We will do clearance 30 days prior to to surgery.

## 2024-07-31 DIAGNOSIS — E03.9 HYPOTHYROIDISM, UNSPECIFIED TYPE: ICD-10-CM

## 2024-07-31 RX ORDER — LEVOTHYROXINE SODIUM 25 UG/1
TABLET ORAL
Qty: 90 TABLET | Refills: 2 | Status: SHIPPED | OUTPATIENT
Start: 2024-07-31

## 2024-08-07 ENCOUNTER — CLINICAL SUPPORT (OUTPATIENT)
Dept: INTERNAL MEDICINE | Facility: CLINIC | Age: 69
End: 2024-08-07
Payer: MEDICARE

## 2024-08-12 ENCOUNTER — TELEPHONE (OUTPATIENT)
Dept: INTERNAL MEDICINE | Facility: CLINIC | Age: 69
End: 2024-08-12
Payer: MEDICARE

## 2024-08-12 NOTE — TELEPHONE ENCOUNTER
----- Message from Leroy Bailey sent at 8/12/2024  9:39 AM CDT -----  .Who Called: Collette Lagunas    Caller is requesting assistance/information from provider's office.    Symptoms (please be specific): n/a   How long has patient had these symptoms:  n/a  List of preferred pharmacies on file (remove unneeded): [unfilled]  If different, enter pharmacy into here including location and phone number: n/a      Preferred Method of Contact: Phone Call  Patient's Preferred Phone Number on File: [phone]   Best Call Back Number, if different:  Additional Information: pt called wanting to reschedule  nurse visit

## 2024-08-15 ENCOUNTER — CLINICAL SUPPORT (OUTPATIENT)
Dept: INTERNAL MEDICINE | Facility: CLINIC | Age: 69
End: 2024-08-15
Payer: MEDICARE

## 2024-08-15 NOTE — PROGRESS NOTES
Pt here for nurse visit for Ozempic injection. Pt has fear of needles and is unable to do self injections. Injection given in left lower abdominal area and tolerated well.

## 2024-08-22 ENCOUNTER — CLINICAL SUPPORT (OUTPATIENT)
Dept: INTERNAL MEDICINE | Facility: CLINIC | Age: 69
End: 2024-08-22
Payer: MEDICARE

## 2024-08-22 NOTE — PROGRESS NOTES
Patient here for nurse visit for ozempic injection. Patient unable to self inject. Patient educated on how to do self injections but is still unable to give inject to self. Given in right lower abdominal area and tolerated well.

## 2024-08-29 ENCOUNTER — TELEPHONE (OUTPATIENT)
Dept: INTERNAL MEDICINE | Facility: CLINIC | Age: 69
End: 2024-08-29

## 2024-08-29 ENCOUNTER — CLINICAL SUPPORT (OUTPATIENT)
Dept: INTERNAL MEDICINE | Facility: CLINIC | Age: 69
End: 2024-08-29
Payer: MEDICARE

## 2024-08-29 NOTE — TELEPHONE ENCOUNTER
----- Message from Leroy Bailey sent at 8/29/2024 11:07 AM CDT -----  .Who Called: Collette Lagunas    Caller is requesting assistance/information from provider's office.    Symptoms (please be specific):    How long has patient had these symptoms:    List of preferred pharmacies on file (remove unneeded):       Preferred Method of Contact: Phone Call  Patient's Preferred Phone Number on File: [phone]   Best Call Back Number, if different:  Additional Information:  pt called wanting to speak with nurse about ozempic medication

## 2024-08-29 NOTE — TELEPHONE ENCOUNTER
Spoke to patient, she questioned about her Ozempic medication needing to be increased for the next dose.  Wanted to make office aware that she will be increasing from 0.25 to 0.5 on her next injection

## 2024-08-29 NOTE — PROGRESS NOTES
Patient here for nurse visit for ozempic injection. Patient unable to self inject. Patient educated on how to do self injections but is still unable to give inject to self. Given in left lower abdominal area and tolerated well.

## 2024-09-03 ENCOUNTER — TELEPHONE (OUTPATIENT)
Dept: INTERNAL MEDICINE | Facility: CLINIC | Age: 69
End: 2024-09-03
Payer: MEDICARE

## 2024-09-03 NOTE — TELEPHONE ENCOUNTER
----- Message from Nadege Buckley sent at 9/3/2024  1:40 PM CDT -----  .Type:  Patient Returning Call    Who Called:pt  Who Left Message for Patient:pt  Does the patient know what this is regarding?:Ozempic  Would the patient rather a call back or a response via [facility]? nica  Best Call Back Number:[phone]  Additional Information: Please call back to schedule nurse visit

## 2024-09-04 ENCOUNTER — CLINICAL SUPPORT (OUTPATIENT)
Dept: INTERNAL MEDICINE | Facility: CLINIC | Age: 69
End: 2024-09-04
Payer: MEDICARE

## 2024-09-04 NOTE — PROGRESS NOTES
Patient here for Ozempic injection. Patient unable to give self injections. Patient reeducated on how to self injection. Injection given in right lower abdominal area and tolerated well.

## 2024-09-09 ENCOUNTER — TELEPHONE (OUTPATIENT)
Dept: INTERNAL MEDICINE | Facility: CLINIC | Age: 69
End: 2024-09-09
Payer: MEDICARE

## 2024-09-09 NOTE — TELEPHONE ENCOUNTER
----- Message from Sarai Allen sent at 9/9/2024  3:59 PM CDT -----  Regarding: nurse visit  Who Called: Collette Lagunas    Caller is requesting assistance/information from provider's office.    Symptoms (please be specific):    How long has patient had these symptoms:    List of preferred pharmacies on file (remove unneeded): [unfilled]  If different, enter pharmacy into here including location and phone number:       Preferred Method of Contact: Phone Call  Patient's Preferred Phone Number on File: [phone]   Best Call Back Number, if different:  Additional Information: pt is requesting a call back regarding nurse visit.

## 2024-09-10 ENCOUNTER — CLINICAL SUPPORT (OUTPATIENT)
Dept: INTERNAL MEDICINE | Facility: CLINIC | Age: 69
End: 2024-09-10
Payer: MEDICARE

## 2024-09-10 NOTE — PROGRESS NOTES
Patient here for nurse visit to get Ozempic injection. Patient unable to give self injections. Injection given in right lower abdominal area and was tolerated well.

## 2024-09-17 ENCOUNTER — CLINICAL SUPPORT (OUTPATIENT)
Dept: INTERNAL MEDICINE | Facility: CLINIC | Age: 69
End: 2024-09-17
Payer: MEDICARE

## 2024-09-17 NOTE — PROGRESS NOTES
Patient here for nurse visit to get Ozempic injection. Given in right lower abdominal area and was tolerated well.

## 2024-09-18 ENCOUNTER — PATIENT MESSAGE (OUTPATIENT)
Dept: INTERNAL MEDICINE | Facility: CLINIC | Age: 69
End: 2024-09-18
Payer: MEDICARE

## 2024-09-24 ENCOUNTER — TELEPHONE (OUTPATIENT)
Dept: INTERNAL MEDICINE | Facility: CLINIC | Age: 69
End: 2024-09-24
Payer: MEDICARE

## 2024-09-24 NOTE — TELEPHONE ENCOUNTER
Patient is scheduled for 10/28/24 with Leila, surgery clearance has to be done within 30 days.  Patient can be scheduled any time in October

## 2024-09-24 NOTE — TELEPHONE ENCOUNTER
----- Message from Grisel Montemayor sent at 9/24/2024  8:52 AM CDT -----  Regarding: BRIGIDA JOSHI  .Who Called: Sarah Zayas ENT  Caller is requesting assistance/information from provider's office.    Symptoms (please be specific): n/a   How long has patient had these symptoms:  n/a  List of preferred pharmacies on file (remove unneeded): [unfilled]  If different, enter pharmacy into here including location and phone number: n/a      Preferred Method of Contact: Phone Call  Patient's Preferred Phone Number on File:   Best Call Back Number, if different:[phone]  Additional Information: Caller is requesting that the pts surgery clearance appointment be rescheduled for a sooner date due to the pt having surgery on 11/1/24 please call to advise

## 2024-10-10 ENCOUNTER — TELEPHONE (OUTPATIENT)
Dept: INTERNAL MEDICINE | Facility: CLINIC | Age: 69
End: 2024-10-10
Payer: MEDICARE

## 2024-10-10 DIAGNOSIS — M85.80 OSTEOPENIA, UNSPECIFIED LOCATION: ICD-10-CM

## 2024-10-10 DIAGNOSIS — E03.9 HYPOTHYROIDISM, UNSPECIFIED TYPE: ICD-10-CM

## 2024-10-10 DIAGNOSIS — I10 PRIMARY HYPERTENSION: Primary | ICD-10-CM

## 2024-10-10 DIAGNOSIS — Z01.818 PRE-OP TESTING: ICD-10-CM

## 2024-10-10 NOTE — TELEPHONE ENCOUNTER
----- Message from Med Assistant Giraldo sent at 10/10/2024  3:18 PM CDT -----  Regarding: ANCELMO Dee 10/17/24 @ 8:00 AM  Fasting labs to be done.

## 2024-10-15 ENCOUNTER — HOSPITAL ENCOUNTER (OUTPATIENT)
Dept: RADIOLOGY | Facility: HOSPITAL | Age: 69
Discharge: HOME | End: 2024-10-15
Attending: OTOLARYNGOLOGY
Payer: MEDICARE

## 2024-10-15 DIAGNOSIS — Z01.818 OTHER SPECIFIED PRE-OPERATIVE EXAMINATION: Primary | ICD-10-CM

## 2024-10-15 DIAGNOSIS — Z01.818 OTHER SPECIFIED PRE-OPERATIVE EXAMINATION: ICD-10-CM

## 2024-10-15 DIAGNOSIS — Z79.01 LONG TERM (CURRENT) USE OF ANTICOAGULANTS: ICD-10-CM

## 2024-10-15 PROCEDURE — 71046 X-RAY EXAM CHEST 2 VIEWS: CPT | Mod: TC

## 2024-10-17 ENCOUNTER — OFFICE VISIT (OUTPATIENT)
Dept: INTERNAL MEDICINE | Facility: CLINIC | Age: 69
End: 2024-10-17
Payer: MEDICARE

## 2024-10-17 ENCOUNTER — TELEPHONE (OUTPATIENT)
Dept: INTERNAL MEDICINE | Facility: CLINIC | Age: 69
End: 2024-10-17

## 2024-10-17 VITALS
HEIGHT: 62 IN | OXYGEN SATURATION: 98 % | HEART RATE: 78 BPM | WEIGHT: 128 LBS | BODY MASS INDEX: 23.55 KG/M2 | RESPIRATION RATE: 16 BRPM | SYSTOLIC BLOOD PRESSURE: 124 MMHG | DIASTOLIC BLOOD PRESSURE: 72 MMHG

## 2024-10-17 DIAGNOSIS — E03.9 HYPOTHYROIDISM, UNSPECIFIED TYPE: ICD-10-CM

## 2024-10-17 DIAGNOSIS — H02.403 PTOSIS OF BOTH EYELIDS: ICD-10-CM

## 2024-10-17 DIAGNOSIS — Z01.818 PRE-OP EVALUATION: Primary | ICD-10-CM

## 2024-10-17 RX ORDER — AMOXICILLIN 500 MG/1
TABLET, FILM COATED ORAL
COMMUNITY
Start: 2024-10-13

## 2024-10-17 RX ORDER — ONDANSETRON 8 MG/1
TABLET, ORALLY DISINTEGRATING ORAL
COMMUNITY
Start: 2024-10-15

## 2024-10-17 RX ORDER — LEVOTHYROXINE SODIUM 25 UG/1
25 TABLET ORAL DAILY
Start: 2024-10-17

## 2024-10-17 RX ORDER — ERYTHROMYCIN 5 MG/G
OINTMENT OPHTHALMIC
COMMUNITY
Start: 2024-10-15

## 2024-10-17 RX ORDER — CEPHALEXIN 500 MG/1
CAPSULE ORAL
COMMUNITY
Start: 2024-10-15

## 2024-10-17 NOTE — PROGRESS NOTES
Internal Medicine    Patient Name:  Collette Lagunas  Patient ID: [id number]     Chief Complaint: Follow-up (Surgery Clearance/Labs done )      HPI:     Collette Lagunas is a 69 y.o. female, known to Dr Roberts, is here today for a surgery clearance.  Plans to have blepharoplasty on 11/1/24.  No other complaints today. Denies exertional/radiating CP, palpitations, or SOB. She is active with regular exercise without issues.  No s/s of bleeding/bruising or hx/family h/o anesthesia problems. Doing well overall.      Pre-Operative Evaluation:    Risk/Complexity of Surgery:   []  High risk (> 5% MI risk): cardiac and aortic and peripheral vascular surgery   [x]  Intermediate risk (1-5% MI risk): head and neck (including CEA), intraperitoneal, intrathoracic, orthopedic, prostate   []  Low risk (1% MI risk): endoscopic procedures, cataract surgery, breast surgery     Current Medical Problems: Is EKG needed?   [] Active CVD (Unstable Angina, Class III or IV Angina, recent MI in past 30 days, decompensated heart failure, SVT>100, High Grade AV block, mobitz type 2 AV block, symptomatic bradycardia or VT, severe aortic stenosis, symptomatic mitral stenosis)  [] Creatinine > 2.0   [] DM on insulin   [] QT prolonging drugs (I.e - antiarrythmics, antipsychotics/SSRIs, flouroquinolones)  [] Hx of abnormal EKG   [x] HTN   [] No active CVD, creatinine > 2.0, DM on insulin - therefore no ECG required.     Functional Status:  < 4 METs (can do light housework (dishwashing), can walk two blocks on level ground, cannot climb a flight of stairs, walk up a hill, or run)   []  Low risk procedure: no testing required   [x]  Intermediate/high risk procedure: EKG and stress testing-done and reviewed with patient  > 4 METs (can rake leaves, weeding or pushing a power mower, walking up two flights of stairs)  [] Low/intermediate risk procedure: no testing required   [] High risk procedure: EKG required     Pregnancy: [x]  No     []  Yes  History  of anesthesia problems: [x]  No   []  Yes  Smoking status: [x]  Non-smoker   []  Smoker  Social support:  [x]  Yes   []  No    Past Medical History:   Diagnosis Date    Degenerative joint disease of knee     Fatigue     HTN (hypertension)     Hypothyroidism, unspecified     Osteopenia     Personal history of colonic polyps     Vitamin B deficiency         Past Surgical History:   Procedure Laterality Date    COLONOSCOPY  10/13/2020    Dr Roni Brown    EYE SURGERY  2004    Lasik surgery    JOINT REPLACEMENT  February 2021    Full knee replacement        Social History     Tobacco Use    Smoking status: Never    Smokeless tobacco: Never   Substance and Sexual Activity    Alcohol use: Never    Drug use: Never    Sexual activity: Yes     Partners: Male     Birth control/protection: None        Current Outpatient Medications   Medication Instructions    amoxicillin (AMOXIL) 500 MG Tab As needed (PRN)    calcium carbonate (OS-NABIL) 600 mg, Once    cephALEXin (KEFLEX) 500 MG capsule     erythromycin (ROMYCIN) ophthalmic ointment     ibuprofen (ADVIL,MOTRIN) 800 mg, Oral, 3 times daily    levothyroxine (SYNTHROID) 25 mcg, Oral, Daily    losartan (COZAAR) 25 MG tablet TAKE 1 TABLET BY MOUTH EVERY DAY    minoxidiL (LONITEN) 2.5 MG tablet Take by mouth.    multivitamin-folic acid-biotin (HAIR-SKIN-NAILS, MV-FA-BIOTIN,) 400-2,000 mcg Tab Take by mouth.    ondansetron (ZOFRAN-ODT) 8 MG TbDL     SEMAGLUTIDE ORAL PLACE 0.5 ML UNDER TONGUE FOR A MINIMUM OF 90 SECONDS THEN SWALLOW ONCE DAILY. EXPIRES 30 DAYS AFTER OPENING       Review of patient's allergies indicates:   Allergen Reactions    Sulfa (sulfonamide antibiotics)      Other reaction(s): Unknown        Patient Care Team:  Joel Roberts MD as PCP - General (Internal Medicine)     Subjective:     ROS    See HPI for details    Constitutional: Denies Change in appetite. Denies Chills. Denies Fever. Denies Night sweats.  Eye: Denies Blurred vision. Denies Discharge. Denies  "Eye pain.  ENT: Denies Decreased hearing. Denies Sore throat. Denies Swollen glands.  Respiratory: Denies Cough. Denies Shortness of breath. Denies Shortness of breath with exertion. Denies Wheezing.  Cardiovascular: DeniesChest pain at rest. Denies Chest pain with exertion. Denies Irregular heartbeat. Denies Palpitations.  Gastrointestinal: Denies Abdominal pain. DeniesDiarrhea. Denies Nausea. Denies Vomiting. Denies Hematemesis or Hematochezia.  Genitourinary: Denies Dysuria. Denies Urinary frequency. Denies Urinary urgency. Denies Blood in urine.  Endocrine: Denies Cold intolerance. Denies Excessive thirst. Denies Heat intolerance. Denies Weight loss. Denies Weight gain.  Musculoskeletal: Denies Painful joints. Denies Weakness.  Integumentary: Denies Rash. Denies Itching. Denies Dry skin.  Neurologic: Denies Dizziness. Denies Fainting. Denies Headache.  Psychiatric: Denies Depression. Denies Anxiety. Denies Suicidal/Homicidal ideations.    All Other ROS: Negative except as stated in HPI.     Objective:     Visit Vitals  /72 (BP Location: Left arm, Patient Position: Sitting)   Pulse 78   Resp 16   Ht 5' 2" (1.575 m)   Wt 58.1 kg (128 lb)   SpO2 98%   BMI 23.41 kg/m²       Wt Readings from Last 3 Encounters:   10/17/24 58.1 kg (128 lb)   07/23/24 62.1 kg (137 lb)   01/17/24 61.9 kg (136 lb 6.4 oz)        Physical Exam     General: Alert and oriented, No acute distress.  Head: Normocephalic, Atraumatic.  Eye: Pupils are equal, round and reactive to light, Extraocular movements are intact, Sclera non-icteric.  Ears/Nose/Throat: Normal, Mucosa moist,Clear.  Neck/Thyroid: Supple, Non-tender, No carotid bruit, No palpable thyromegaly or thyroid nodule, No lymphadenopathy, No JVD, Full range of motion.  Respiratory: Clear to auscultation bilaterally; No wheezes, rales or rhonchi,Non-labored respirations, Symmetrical chest wall expansion.  Cardiovascular: Regular rate and rhythm, S1/S2 normal, No murmurs, rubs or " gallops.  Gastrointestinal: Soft, Non-tender, Non-distended, Normal bowel sounds, No palpable organomegaly.  Musculoskeletal: Normal range of motion.  Integumentary: Warm, Dry, Intact, No suspicious lesions or rashes.  Extremities: No clubbing, cyanosis or edema  Neurologic: No focal deficits, Cranial Nerves II-XII are grossly intact, Motor strength normal upper and lower extremities, Sensory exam intact.  Psychiatric: Normal interaction, Coherent speech, Euthymic mood, Appropriate affect     Assessment:       ICD-10-CM ICD-9-CM   1. Pre-op evaluation  Z01.818 V72.84   2. Ptosis of both eyelids  H02.403 374.30   3. Hypothyroidism, unspecified type  E03.9 244.9        Plan:     1. Pre-op evaluation  Medically cleared for sx  Reviewed labs, EKG, and CXR, all of which stable    2. Ptosis of both eyelids  Proceed with current POC per Dr. Groves    3. Hypothyroidism, unspecified type  Stable on current dosage    -     levothyroxine (SYNTHROID) 25 MCG tablet; Take 1 tablet (25 mcg total) by mouth once daily.         The patient is cleared for the planned procedure as scheduled as all of their chronic conditions are optimally controlled.    Preoperative Medication Adjustment  []  Plavix/Brilinta - Stop 5-7 days prior to surgery, resume with PO intake.   []  NSAIDs - Stop 1-3 days prior.   []  Pradaxa - Stop 2-5 days prior, resume with PO intake.   []  Xarelto - Stop at least 24 hrs prior, resume with PO intake.   []  Coumadin Low Thromboembolic Risk (Afib with no embolism in 12 mo, biologic heart valves > 3 mo ago) - Stop 5 days pre-op and restart post-op.   []  Coumadin High Thromboembolic Risk (mechanical heart valve, VTE with hypercoagulable state or < 3 mo ago) - Stop 4 days pre-op and start LMWH, stop LMWH 12-18h pre-op, restart LMWH 6h post-op, restart warfarin with PO intake, stop LMWH when INR = 2.0.   []  Insulin - Continue BASAL insulin at 1/2 dose, hold short-acting and regular insulin until eating again.   []   Oral Hypoglycemics: Hold on day of surgery; hold metformin 48 hours preoperatively.  [x]  AntiHTN: Hold diuretic, hold ace-inhibitors, hold arbs, continue others.  []  Sedative: Hold 24 hours preoperatively.  []  Lithium: Check serum level, hold on day of surgery.  []  Levodopa/Carbidopa: Hold on day of surgery.  []  Estrogen: Hold on day of surgery; resume when patient ambulates.    In addition to their scheduled follow up, the patient has also been instructed to follow up on as needed basis.     Future Appointments   Date Time Provider Department Center   1/27/2025  1:00 PM Joel Roberts MD [id number]   7/25/2025  9:40 AM Joel Roberts MD [facility]          Leila Lopez NP

## 2024-10-17 NOTE — TELEPHONE ENCOUNTER
----- Message from Sarai sent at 10/17/2024  8:48 AM CDT -----  Regarding: general message  Sarah null/ Andie TUBBS is requesting to have pt's SX clearance paperwork faxed ASAP , states pt Sx is scheduled for 11/1/24

## 2024-10-17 NOTE — TELEPHONE ENCOUNTER
----- Message from Nurse Kwok sent at 10/17/2024  8:57 AM CDT -----  Regarding: FW: general message    ----- Message -----  From: Sarai Allen  Sent: 10/17/2024   8:50 AM CDT  To: Armando PIZARRO Staff  Subject: general message                                  Sarah null/ Andie TUBBS is requesting to have pt's SX clearance paperwork faxed ASAP , states pt Sx is scheduled for 11/1/24

## 2024-10-18 ENCOUNTER — DOCUMENTATION ONLY (OUTPATIENT)
Dept: INTERNAL MEDICINE | Facility: CLINIC | Age: 69
End: 2024-10-18
Payer: MEDICARE

## 2024-10-18 ENCOUNTER — PATIENT MESSAGE (OUTPATIENT)
Dept: INTERNAL MEDICINE | Facility: CLINIC | Age: 69
End: 2024-10-18
Payer: MEDICARE

## 2024-10-25 ENCOUNTER — PATIENT MESSAGE (OUTPATIENT)
Dept: INTERNAL MEDICINE | Facility: CLINIC | Age: 69
End: 2024-10-25

## 2024-10-31 ENCOUNTER — ANESTHESIA EVENT (OUTPATIENT)
Facility: HOSPITAL | Age: 69
End: 2024-10-31
Payer: MEDICARE

## 2024-10-31 DIAGNOSIS — I10 HYPERTENSION, UNSPECIFIED TYPE: ICD-10-CM

## 2024-10-31 RX ORDER — LOSARTAN POTASSIUM 25 MG/1
TABLET ORAL
Qty: 90 TABLET | Refills: 3 | Status: SHIPPED | OUTPATIENT
Start: 2024-10-31

## 2024-10-31 NOTE — ANESTHESIA PREPROCEDURE EVALUATION
"                                                                                                             10/31/2024  Collette Lagunas is a 69 y.o., female presents as an outpatient for endoscopic brow lift with bilateral upper and lower blepharoplasty.  Recent EKG normal sinus rhythm    Transthoracic echo January 2024   EF 60% with normal diastolic function   Mild-to-moderate MR, mild TR    Last 3 sets of Vitals        10/17/2024     8:09 AM 10/30/2024     2:03 PM 11/1/2024     8:52 AM   Vitals - 1 value per visit   SYSTOLIC 124  114   DIASTOLIC 72  72   Pulse 78  82   Temp   37.2 °C (99 °F)   Resp 16  14   SPO2 98 %  97 %   Weight (lb) 128 128    Weight (kg) 58.06 58.06    Height 5' 2" (1.575 m) 5' 3" (1.6 m)    BMI (Calculated) 23.4 22.7          Lab Results   Component Value Date    WBC 6.60 10/15/2024    HGB 12.8 10/15/2024    HCT 39.1 10/15/2024    MCV 93.1 10/15/2024     10/15/2024          BMP  Lab Results   Component Value Date     10/15/2024    K 3.9 10/15/2024     10/15/2024    CO2 24 10/15/2024    BUN 13.5 10/15/2024    CREATININE 0.75 10/15/2024    CALCIUM 9.4 10/15/2024    EGFRNONAA >60 07/15/2022        Pre-op Assessment    I have reviewed the Patient Summary Reports.    I have reviewed the NPO Status.   I have reviewed the Medications.     Review of Systems  Anesthesia Hx:               Denies Personal Hx of Anesthesia complications.                    Social:  Non-Smoker       Cardiovascular:     Hypertension                  Functional Capacity good / => 4 METS                         Endocrine:   Hypothyroidism              Physical Exam  General: Well nourished, Cooperative, Alert and Oriented    Airway:  Mallampati: II   Mouth Opening: Normal  TM Distance: Normal  Tongue: Normal  Neck ROM: Normal ROM    Dental:  Intact    Chest/Lungs:  Clear to auscultation, Normal Respiratory Rate    Heart:  Rate: Normal  Rhythm: Regular Rhythm        Anesthesia Plan  Type of Anesthesia, " risks & benefits discussed:    Anesthesia Type: Gen ETT  Intra-op Monitoring Plan: Standard ASA Monitors  Post Op Pain Control Plan: multimodal analgesia and IV/PO Opioids PRN  Induction:  IV  Airway Plan: Direct  Informed Consent: Informed consent signed with the Patient and all parties understand the risks and agree with anesthesia plan.  All questions answered.   ASA Score: 2  Day of Surgery Review of History & Physical: H&P Update referred to the surgeon/provider.    Ready For Surgery From Anesthesia Perspective.     .

## 2024-11-01 ENCOUNTER — HOSPITAL ENCOUNTER (OUTPATIENT)
Facility: HOSPITAL | Age: 69
Discharge: HOME | End: 2024-11-01
Attending: OTOLARYNGOLOGY | Admitting: OTOLARYNGOLOGY
Payer: MEDICARE

## 2024-11-01 ENCOUNTER — ANESTHESIA (OUTPATIENT)
Facility: HOSPITAL | Age: 69
End: 2024-11-01
Payer: MEDICARE

## 2024-11-01 VITALS
RESPIRATION RATE: 12 BRPM | TEMPERATURE: 99 F | HEART RATE: 89 BPM | BODY MASS INDEX: 22.68 KG/M2 | DIASTOLIC BLOOD PRESSURE: 63 MMHG | SYSTOLIC BLOOD PRESSURE: 115 MMHG | HEIGHT: 63 IN | WEIGHT: 128 LBS | OXYGEN SATURATION: 95 %

## 2024-11-01 DIAGNOSIS — H02.409 BLEPHAROPTOSIS: ICD-10-CM

## 2024-11-01 PROCEDURE — D9220A PRA ANESTHESIA: Mod: CSM,,, | Performed by: ANESTHESIOLOGY

## 2024-11-01 PROCEDURE — C1713 ANCHOR/SCREW BN/BN,TIS/BN: HCPCS | Performed by: OTOLARYNGOLOGY

## 2024-11-01 PROCEDURE — 25000003 PHARM REV CODE 250: Performed by: OTOLARYNGOLOGY

## 2024-11-01 PROCEDURE — 37000008 HC ANESTHESIA 1ST 15 MINUTES: Performed by: OTOLARYNGOLOGY

## 2024-11-01 PROCEDURE — 27201423 OPTIME MED/SURG SUP & DEVICES STERILE SUPPLY: Performed by: OTOLARYNGOLOGY

## 2024-11-01 PROCEDURE — 63600175 PHARM REV CODE 636 W HCPCS: Performed by: ANESTHESIOLOGY

## 2024-11-01 PROCEDURE — 25000003 PHARM REV CODE 250: Performed by: NURSE ANESTHETIST, CERTIFIED REGISTERED

## 2024-11-01 PROCEDURE — 36000706: Performed by: OTOLARYNGOLOGY

## 2024-11-01 PROCEDURE — 25000003 PHARM REV CODE 250: Performed by: ANESTHESIOLOGY

## 2024-11-01 PROCEDURE — 63600175 PHARM REV CODE 636 W HCPCS: Mod: JZ | Performed by: OTOLARYNGOLOGY

## 2024-11-01 PROCEDURE — 71000016 HC POSTOP RECOV ADDL HR: Performed by: OTOLARYNGOLOGY

## 2024-11-01 PROCEDURE — 63600175 PHARM REV CODE 636 W HCPCS: Performed by: OTOLARYNGOLOGY

## 2024-11-01 PROCEDURE — 71000015 HC POSTOP RECOV 1ST HR: Performed by: OTOLARYNGOLOGY

## 2024-11-01 PROCEDURE — 37000009 HC ANESTHESIA EA ADD 15 MINS: Performed by: OTOLARYNGOLOGY

## 2024-11-01 PROCEDURE — 63600175 PHARM REV CODE 636 W HCPCS: Performed by: NURSE ANESTHETIST, CERTIFIED REGISTERED

## 2024-11-01 PROCEDURE — 71000033 HC RECOVERY, INTIAL HOUR: Performed by: OTOLARYNGOLOGY

## 2024-11-01 PROCEDURE — 36000707: Performed by: OTOLARYNGOLOGY

## 2024-11-01 DEVICE — ANCHOR ULTRATINE FOREHEAD 3.5: Type: IMPLANTABLE DEVICE | Site: FOREHEAD | Status: FUNCTIONAL

## 2024-11-01 RX ORDER — ONDANSETRON HYDROCHLORIDE 2 MG/ML
4 INJECTION, SOLUTION INTRAVENOUS DAILY PRN
Status: DISCONTINUED | OUTPATIENT
Start: 2024-11-01 | End: 2024-11-01 | Stop reason: HOSPADM

## 2024-11-01 RX ORDER — LIDOCAINE HYDROCHLORIDE 10 MG/ML
1 INJECTION, SOLUTION EPIDURAL; INFILTRATION; INTRACAUDAL; PERINEURAL ONCE
OUTPATIENT
Start: 2024-11-01 | End: 2024-11-01

## 2024-11-01 RX ORDER — LIDOCAINE HYDROCHLORIDE AND EPINEPHRINE 10; 10 MG/ML; UG/ML
INJECTION, SOLUTION INFILTRATION; PERINEURAL
Status: DISCONTINUED | OUTPATIENT
Start: 2024-11-01 | End: 2024-11-01 | Stop reason: HOSPADM

## 2024-11-01 RX ORDER — CLINDAMYCIN PHOSPHATE 150 MG/ML
INJECTION, SOLUTION INTRAVENOUS
Status: DISCONTINUED | OUTPATIENT
Start: 2024-11-01 | End: 2024-11-01 | Stop reason: HOSPADM

## 2024-11-01 RX ORDER — CEFAZOLIN 2 G/1
2 INJECTION, POWDER, FOR SOLUTION INTRAMUSCULAR; INTRAVENOUS ONCE
Status: COMPLETED | OUTPATIENT
Start: 2024-11-01 | End: 2024-11-01

## 2024-11-01 RX ORDER — HYDROXYZINE 50 MG/ML
25 INJECTION, SOLUTION INTRAMUSCULAR EVERY 6 HOURS PRN
Status: DISCONTINUED | OUTPATIENT
Start: 2024-11-01 | End: 2024-11-01 | Stop reason: HOSPADM

## 2024-11-01 RX ORDER — ACETAMINOPHEN 10 MG/ML
1000 INJECTION, SOLUTION INTRAVENOUS ONCE
Status: COMPLETED | OUTPATIENT
Start: 2024-11-01 | End: 2024-11-01

## 2024-11-01 RX ORDER — ONDANSETRON HYDROCHLORIDE 2 MG/ML
INJECTION, SOLUTION INTRAMUSCULAR; INTRAVENOUS
Status: DISCONTINUED | OUTPATIENT
Start: 2024-11-01 | End: 2024-11-01

## 2024-11-01 RX ORDER — ERYTHROMYCIN 5 MG/G
OINTMENT OPHTHALMIC
Status: DISCONTINUED | OUTPATIENT
Start: 2024-11-01 | End: 2024-11-01 | Stop reason: HOSPADM

## 2024-11-01 RX ORDER — MIDAZOLAM HYDROCHLORIDE 2 MG/2ML
INJECTION, SOLUTION INTRAMUSCULAR; INTRAVENOUS
Status: DISCONTINUED
Start: 2024-11-01 | End: 2024-11-01 | Stop reason: HOSPADM

## 2024-11-01 RX ORDER — GLUCAGON 1 MG
1 KIT INJECTION
Status: DISCONTINUED | OUTPATIENT
Start: 2024-11-01 | End: 2024-11-01 | Stop reason: HOSPADM

## 2024-11-01 RX ORDER — ACETAMINOPHEN 10 MG/ML
INJECTION, SOLUTION INTRAVENOUS
Status: DISCONTINUED
Start: 2024-11-01 | End: 2024-11-01 | Stop reason: HOSPADM

## 2024-11-01 RX ORDER — PROCHLORPERAZINE EDISYLATE 5 MG/ML
5 INJECTION INTRAMUSCULAR; INTRAVENOUS EVERY 30 MIN PRN
Status: DISCONTINUED | OUTPATIENT
Start: 2024-11-01 | End: 2024-11-01 | Stop reason: HOSPADM

## 2024-11-01 RX ORDER — MEPERIDINE HYDROCHLORIDE 25 MG/ML
50 INJECTION INTRAMUSCULAR; INTRAVENOUS; SUBCUTANEOUS EVERY 4 HOURS PRN
Status: DISCONTINUED | OUTPATIENT
Start: 2024-11-01 | End: 2024-11-01 | Stop reason: HOSPADM

## 2024-11-01 RX ORDER — PREDNISOLONE ACETATE 10 MG/ML
SUSPENSION/ DROPS OPHTHALMIC
Status: DISCONTINUED | OUTPATIENT
Start: 2024-11-01 | End: 2024-11-01 | Stop reason: HOSPADM

## 2024-11-01 RX ORDER — CEFAZOLIN 2 G/1
INJECTION, POWDER, FOR SOLUTION INTRAMUSCULAR; INTRAVENOUS
Status: DISCONTINUED
Start: 2024-11-01 | End: 2024-11-01 | Stop reason: HOSPADM

## 2024-11-01 RX ORDER — LIDOCAINE HYDROCHLORIDE 10 MG/ML
INJECTION, SOLUTION EPIDURAL; INFILTRATION; INTRACAUDAL; PERINEURAL
Status: DISCONTINUED | OUTPATIENT
Start: 2024-11-01 | End: 2024-11-01

## 2024-11-01 RX ORDER — ONDANSETRON 4 MG/1
8 TABLET, ORALLY DISINTEGRATING ORAL EVERY 6 HOURS PRN
OUTPATIENT
Start: 2024-11-01

## 2024-11-01 RX ORDER — PROPOFOL 10 MG/ML
VIAL (ML) INTRAVENOUS
Status: DISCONTINUED | OUTPATIENT
Start: 2024-11-01 | End: 2024-11-01

## 2024-11-01 RX ORDER — ROCURONIUM BROMIDE 10 MG/ML
INJECTION, SOLUTION INTRAVENOUS
Status: DISCONTINUED | OUTPATIENT
Start: 2024-11-01 | End: 2024-11-01

## 2024-11-01 RX ORDER — GABAPENTIN 300 MG/1
300 CAPSULE ORAL ONCE
Status: COMPLETED | OUTPATIENT
Start: 2024-11-01 | End: 2024-11-01

## 2024-11-01 RX ORDER — PHENYLEPHRINE HYDROCHLORIDE 10 MG/ML
INJECTION INTRAVENOUS
Status: DISCONTINUED | OUTPATIENT
Start: 2024-11-01 | End: 2024-11-01

## 2024-11-01 RX ORDER — GABAPENTIN 300 MG/1
CAPSULE ORAL
Status: DISCONTINUED
Start: 2024-11-01 | End: 2024-11-01 | Stop reason: HOSPADM

## 2024-11-01 RX ORDER — HYDROMORPHONE HYDROCHLORIDE 2 MG/ML
INJECTION, SOLUTION INTRAMUSCULAR; INTRAVENOUS; SUBCUTANEOUS
Status: DISCONTINUED | OUTPATIENT
Start: 2024-11-01 | End: 2024-11-01

## 2024-11-01 RX ORDER — IPRATROPIUM BROMIDE AND ALBUTEROL SULFATE 2.5; .5 MG/3ML; MG/3ML
3 SOLUTION RESPIRATORY (INHALATION)
Status: DISCONTINUED | OUTPATIENT
Start: 2024-11-01 | End: 2024-11-01 | Stop reason: HOSPADM

## 2024-11-01 RX ORDER — MIDAZOLAM HYDROCHLORIDE 2 MG/2ML
2 INJECTION, SOLUTION INTRAMUSCULAR; INTRAVENOUS ONCE AS NEEDED
Status: COMPLETED | OUTPATIENT
Start: 2024-11-01 | End: 2024-11-01

## 2024-11-01 RX ORDER — DEXAMETHASONE SODIUM PHOSPHATE 4 MG/ML
INJECTION, SOLUTION INTRA-ARTICULAR; INTRALESIONAL; INTRAMUSCULAR; INTRAVENOUS; SOFT TISSUE
Status: DISCONTINUED | OUTPATIENT
Start: 2024-11-01 | End: 2024-11-01

## 2024-11-01 RX ORDER — TRANEXAMIC ACID 100 MG/ML
INJECTION, SOLUTION INTRAVENOUS
Status: DISCONTINUED | OUTPATIENT
Start: 2024-11-01 | End: 2024-11-01 | Stop reason: HOSPADM

## 2024-11-01 RX ORDER — FENTANYL CITRATE 50 UG/ML
INJECTION, SOLUTION INTRAMUSCULAR; INTRAVENOUS
Status: DISCONTINUED | OUTPATIENT
Start: 2024-11-01 | End: 2024-11-01

## 2024-11-01 RX ORDER — MEPERIDINE HYDROCHLORIDE 25 MG/ML
12.5 INJECTION INTRAMUSCULAR; INTRAVENOUS; SUBCUTANEOUS EVERY 10 MIN PRN
Status: DISCONTINUED | OUTPATIENT
Start: 2024-11-01 | End: 2024-11-01 | Stop reason: HOSPADM

## 2024-11-01 RX ADMIN — PHENYLEPHRINE HYDROCHLORIDE 100 MCG: 10 INJECTION INTRAVENOUS at 11:11

## 2024-11-01 RX ADMIN — FENTANYL CITRATE 50 MCG: 50 INJECTION, SOLUTION INTRAMUSCULAR; INTRAVENOUS at 11:11

## 2024-11-01 RX ADMIN — LIDOCAINE HYDROCHLORIDE 20 MG: 10 INJECTION, SOLUTION EPIDURAL; INFILTRATION; INTRACAUDAL; PERINEURAL at 11:11

## 2024-11-01 RX ADMIN — PHENYLEPHRINE HYDROCHLORIDE 100 MCG: 10 INJECTION INTRAVENOUS at 12:11

## 2024-11-01 RX ADMIN — ONDANSETRON HYDROCHLORIDE 4 MG: 2 SOLUTION INTRAMUSCULAR; INTRAVENOUS at 11:11

## 2024-11-01 RX ADMIN — ACETAMINOPHEN 1000 MG: 10 INJECTION, SOLUTION INTRAVENOUS at 09:11

## 2024-11-01 RX ADMIN — GABAPENTIN 300 MG: 300 CAPSULE ORAL at 09:11

## 2024-11-01 RX ADMIN — MIDAZOLAM HYDROCHLORIDE 2 MG: 1 INJECTION, SOLUTION INTRAMUSCULAR; INTRAVENOUS at 11:11

## 2024-11-01 RX ADMIN — ROCURONIUM BROMIDE 50 MG: 10 INJECTION, SOLUTION INTRAVENOUS at 11:11

## 2024-11-01 RX ADMIN — SODIUM CHLORIDE, POTASSIUM CHLORIDE, SODIUM LACTATE AND CALCIUM CHLORIDE: 600; 310; 30; 20 INJECTION, SOLUTION INTRAVENOUS at 11:11

## 2024-11-01 RX ADMIN — SUGAMMADEX 120 MG: 100 INJECTION, SOLUTION INTRAVENOUS at 12:11

## 2024-11-01 RX ADMIN — DEXAMETHASONE SODIUM PHOSPHATE 12 MG: 4 INJECTION, SOLUTION INTRA-ARTICULAR; INTRALESIONAL; INTRAMUSCULAR; INTRAVENOUS; SOFT TISSUE at 11:11

## 2024-11-01 RX ADMIN — CEFAZOLIN 2 G: 2 INJECTION, POWDER, FOR SOLUTION INTRAMUSCULAR; INTRAVENOUS at 11:11

## 2024-11-01 RX ADMIN — HYDROMORPHONE HYDROCHLORIDE 0.4 MG: 2 INJECTION, SOLUTION INTRAMUSCULAR; INTRAVENOUS; SUBCUTANEOUS at 12:11

## 2024-11-01 RX ADMIN — PROPOFOL 120 MG: 10 INJECTION, EMULSION INTRAVENOUS at 11:11

## 2024-11-01 NOTE — DISCHARGE INSTRUCTIONS
EndoBrow and Blepharoplasty, Care After      Refer to this sheet in the next few weeks. These discharge instructions provide you with general information on caring for yourself after you leave the hospital. Your caregiver may also give you specific instructions. Your treatment has been planned according to the most current medical practices available, but unavoidable complications occasionally occur. If you have any problems or questions after discharge, call your caregiver.    HOME CARE INSTRUCTIONS:    Resume home meds unless otherwise discussed with doctor    No work/school until cleared by doctor    Get plenty of rest and do not get overheated    Diet as tolerated    Elevated head of bed (Recliner, Wedge or multiple pillows)    No heavy lifting, no bending over for at least two weeks after surgery unless cleared by doctor. No driving for one week following surgery unless cleared by doctor    ENDOBROW POST OP INSTRUCTIONS:    Once instructed by your surgeon to begin, you may use a q-tip and peroxide to clean head incision lines three times per day. Apply Bactroban/Polysporin ointment to incisions after cleaning    Avoid any hair treatment (color/perm) for at least 1-2 weeks before and 6-8 weeks after surgery    The office will shampoo your hair the first time for you. After initial wash use baby shampoo at home for 2 weeks when washing hair    Avoid sun and tanning beds    When turning your head, move the head and shoulders deliberately as a single unit    BLEPH POST OP INSTRUCTIONS:    Iced gauze to eyes for at least 48 hours after procedure    No wearing contact lenses for 2 weeks after surgery    No wearing mascara, eyeliner or eye shadow for 10-14 days after surgery    If an incision was made on the outside of the eyelid, use a q-tip and apply erythromycin opthalmic ointment to eyelid incisions three times per day    After 48 hours you may get the eyelid sutures wet, but be careful to keep the force of the  water from beating directly on the incision when showering.  If your head dressing has not been removed after the 48 hour time frame, do not shower until it has been removed by your surgeon.    WHAT TO EXPECT AFTER SURGERY:    Facial and neck swelling and bruising  Incisions that are raised and/or red  Mild pain or discomfort  Redness to the whites of the eyeball; this is only a form of bruising  Slight itching and tightness of eyelids  If any excessive bleeding persists, apply light pressure and hold for 15 minutes. If bleeding does not stop, call physician's office  If any changes in vision occur, call physician's office    SEEK MEDICAL CARE IF:    You develop bleeding.  You develop redness, swelling, or increasing pain in your eyelids.  You develop facial pain.  You have pus coming from the wound.    **Call the office immediately to report any vision changes, unexplained development of pain, facial swelling, fever or any other questions/concerns - [phone] **    SEEK IMMEDIATE MEDICAL CARE IF:    You have a fever.    You develop a rash.    You have difficulty breathing.    You develop any reaction or side effects to medicines given.         MEDICATIONS    ?First dose of Hydrocodone/Norco (narcotic/pain medication), if needed may taken next at _____________. Take with food. Can alternate with Tylenol. No Ibuprofen, Advil, Aleve Aspirin or NSAIDS.    ?First dose of Ondansetron/Zofran (anti-emetic), if needed for nausea, may be taken next at _____________.    ?Take first dose of Arnica dissolving tablet tonight. This is for bruising/swelling prevention.  Resume all other surgery vitamins the day after surgery.    ?Resume Cephalexin (antibiotic) the morning after surgery.    ?May take next dose of Diazepam/Valium (anxiolytic) tonight at for relaxation.    ?May take Ambien (insomnia relief) at tonight if unable to sleep.  You must wait 4 hours following a Valium dose to reduce the risk of oversedation.    ? May use  artificial tears for dry, itchy, burning eyes as needed.     ?Iced gauze to eyes for 48 hours.    ?Bring prescribed ointments/drops to post op appointment.     ?Do not cut/brush/remove the 4 black strings running from your forehead to your eyes.  These are SUTURES.    Follow up scheduled for 11/2/24 @ 8:15 am    For questions or concerns please call Dr. Morales office at [phone].     Sugammadex Discharge Instructions  Notice:  During your procedure, you were given a drug called Sugammadex to reverse the effects of anesthesia.  As a result, hormonal birth control (such as birth control pills, patches, rings, injections, or IUDs) may not work properly for 7 days after receiving Sugammadex.

## 2024-11-01 NOTE — ANESTHESIA PROCEDURE NOTES
Intubation    Date/Time: 11/1/2024 11:25 AM    Performed by: John Roy CRNA  Authorized by: Momo Guardado Jr., MD    Intubation:     Induction:  Intravenous    Intubated:  Postinduction    Mask Ventilation:  Easy mask    Attempts:  1    Attempted By:  CRNA    Method of Intubation:  Direct    Blade:  Elsa 3    Laryngeal View Grade: Grade I - full view of cords      Difficult Airway Encountered?: No      Complications:  None    Airway Device:  Oral endotracheal tube    Airway Device Size:  7.0    Style/Cuff Inflation:  Cuffed (inflated to minimal occlusive pressure)    Tube secured:  20    Secured at:  The lips    Placement Verified By:  Capnometry    Complicating Factors:  None    Findings Post-Intubation:  BS equal bilateral and atraumatic/condition of teeth unchanged

## 2024-11-01 NOTE — TRANSFER OF CARE
"Anesthesia Transfer of Care Note    Patient: Collette Lagunas    Procedure(s) Performed: Procedure(s) (LRB):  All Cosmetic- Endoscopic Browlift with Jocy, Bilateral Upper Blephs, Bilateral Lower Blephs - *Endotines Required* (N/A)  BLEPHAROPLASTY, UPPER EYELID (Bilateral)  BLEPHAROPLASTY, LOWER EYELID (Bilateral)    Patient location: PACU    Anesthesia Type: general    Transport from OR: Transported from OR on room air with adequate spontaneous ventilation    Post pain: adequate analgesia    Post assessment: no apparent anesthetic complications    Post vital signs: stable    Level of consciousness: responds to stimulation    Nausea/Vomiting: no nausea/vomiting    Complications: none    Transfer of care protocol was followed      Last vitals: Visit Vitals  BP (!) 85/49   Pulse 77   Temp 37 °C (98.6 °F)   Resp (!) 8   Ht 5' 3" (1.6 m)   Wt 58.1 kg (128 lb)   SpO2 98%   Breastfeeding No   BMI 22.67 kg/m²     "

## 2024-11-01 NOTE — OP NOTE
[facility]                         [address] [address]    PATIENT NAME:      PAUL VAUGHN  YOB: 1955  CSN:               [id number]  MRN:               [id number]  ADMIT DATE:        11/01/2024 08:31:00  PHYSICIAN:         Nathan Groves                          OPERATIVE REPORT      DATE OF SURGERY:        SURGEON:  Nathan Groves    PREOPERATIVE DIAGNOSES:  Brow ptosis, dermatochalasis.    POSTOPERATIVE DIAGNOSES:  Brow ptosis, dermatochalasis.    PROCEDURES PERFORMED:  Endoscopic brow lift upper lid blepharoplasty,   transconjunctival lower lid blepharoplasty with skin pinch and orbicularis   plication.    ASSISTANT:  Hedy Thurston RN    PROCEDURE IN DETAIL:  The patient was brought to the operating room and placed   in supine position.  After achieving general endotracheal anesthesia, a   combination of 1% lidocaine with 1:100,000 epinephrine was injected into the   soft tissue marked for undermining.  The face was prepped and draped for   procedure.    We began the endoscopic brow lift making a central incision and two lateral   incisions made in the hair bearing scalp approximately 1 cm from the hairline   down to the periosteum where the periosteum was incised and an anterior cavity   was dissected. With the use of periosteal elevators, a large amount of the   forehead was done without endoscopic guidance down to an area of approximately   1.5 cm above the supraorbital rims. From that point, the 30 degree endoscope was   used to dissect the arcus marginalis with care being taken to identify both   supraorbital and supratrochlear nerves while teasing the  muscles   significantly to less their motion. Once this was completed, we turned our   attention to the temporal cavities which were dissected by making a skin   incision in the hair bearing scalp  down to the superficial layer of the deep   temporal fascia and then with the use of a 30 degree endoscope as well as the   temporal dissector the temporal pockets were dissected and connected to the   anterior cavity by incision the periosteum over the lateral orbital rim and then   connecting them by moving the elevator from inferior to superior from the   lateral pocket medially to the anterior pocket. Hemostasis was achieved where   necessary using the bipolar cautery. This was done bilaterally. A 2-0 PDS suture   was used in mattress fashion to suture the soft tissue to the deep temporal   fascia. Once this was completed, the posterior dissection was also done in a   subperiosteal plane to the nuchal line. Once this was done, the scalp was   retracted posteriorly and Ultratines were placed through both lateral incisions,   Evicel sprayed and the Ultratines engaged.  All incisions were then closed with   clips.    We turned our attention to the upper lid blepharoplasty where corneal protectors   were placed to protect both eyes.  A predetermined amount of upper eyelid skin   was removed using sharp dissection.  A strip of orbicularis oculi muscle was   taken and hemostasis was achieved using bipolar cautery.  The fat in the central   and nasal compartments were removed using the clamp, cut and cautery technique   and the lids were closed with 6-0 nylon in a running fashion.  This procedure   was done bilaterally.    The eye was protected with the corneal protector and a [place] needle was then   used to incise the conjunctiva as well as lower lid retractors.  Upon doing so,   fat in the nasal, central, lateral and temporal compartments were removed using   the clamp, cut and cautery technique with care being taken to remove only that   fat which protruded from the orbital rim.  Once the appropriate amount of fat   was removed, the lid was snapped back into position and the corneal protector   was removed.  BSS  was used to rinse the eye thoroughly prior to the completion   of the procedure.    Using -Valeriano forceps, a pinch of redundant lower lid skin in a subciliary   crease was removed with tenotomy scissors.  The wound was closed with 6-0 plain   gut suture in interrupted fashion.  This was done bilaterally.    After completion of the lower lid blepharoplasty, incision was made in the   crow's feet down through skin, subcutaneous tissues and orbicularis.  Redundant   skin and orbicularis was removed and the orbicularis was plicated to the lateral   orbital rim with a 5-0 PDS suture.  5-0 plain suture in an interrupted fashion   was used to close the incision.  This was done bilaterally.    After completion of procedure, the eyes were washed with BSS.  Erythromycin   ophthalmic ointment applied where necessary and cold compresses applied.     The patient tolerated the procedure well and was extubated and awakened in the   operating room and brought to the recovery room in stable condition.    DISCHARGE SUMMARY:  Ms. Lagunas is status post endoscopic brow lift and upper   and lower lid blepharoplasty and doing well.  We will discharge her home and   will follow in the office tomorrow on standard postoperative medications and   structures.        ______________________________  Nathan FUENTES/AQS  DD:  11/01/2024  Time:  12:50PM  DT:  11/01/2024  Time:  03:00PM  Job #:  [id number]      OPERATIVE REPORT

## 2024-11-04 NOTE — ANESTHESIA POSTPROCEDURE EVALUATION
Anesthesia Post Evaluation    Patient: Collette Lagunas    Procedure(s) Performed: Procedure(s) (LRB):  All Cosmetic- Endoscopic Browlift with Jocy, Bilateral Upper Blephs, Bilateral Lower Blephs - *Endotines Required* (N/A)  BLEPHAROPLASTY, UPPER EYELID (Bilateral)  BLEPHAROPLASTY, LOWER EYELID (Bilateral)    Final Anesthesia Type: general      Patient location during evaluation: floor  Patient participation: Yes- Able to Participate  Level of consciousness: awake and alert  Post-procedure vital signs: reviewed and stable  Pain management: adequate  Airway patency: patent    PONV status at discharge: No PONV  Anesthetic complications: no      Cardiovascular status: blood pressure returned to baseline  Respiratory status: spontaneous ventilation and room air  Hydration status: euvolemic  Follow-up not needed.              Vitals Value Taken Time   /63 11/01/24 1515   Temp 37 °C (98.6 °F) 11/01/24 1307   Pulse 89 11/01/24 1518   Resp 12 11/01/24 1320   SpO2 95 % 11/01/24 1518         Event Time   Out of Recovery 14:00:00         Pain/Shelley Score: No data recorded

## 2024-11-21 ENCOUNTER — PATIENT MESSAGE (OUTPATIENT)
Dept: INTERNAL MEDICINE | Facility: CLINIC | Age: 69
End: 2024-11-21
Payer: MEDICARE

## 2024-11-21 DIAGNOSIS — R41.3 MEMORY LOSS: Primary | ICD-10-CM

## 2024-11-21 DIAGNOSIS — F03.90 DEMENTIA, UNSPECIFIED DEMENTIA SEVERITY, UNSPECIFIED DEMENTIA TYPE, UNSPECIFIED WHETHER BEHAVIORAL, PSYCHOTIC, OR MOOD DISTURBANCE OR ANXIETY: ICD-10-CM

## 2025-01-06 ENCOUNTER — PATIENT MESSAGE (OUTPATIENT)
Dept: INTERNAL MEDICINE | Facility: CLINIC | Age: 70
End: 2025-01-06
Payer: MEDICARE

## 2025-01-24 ENCOUNTER — TELEPHONE (OUTPATIENT)
Dept: INTERNAL MEDICINE | Facility: CLINIC | Age: 70
End: 2025-01-24
Payer: MEDICARE

## 2025-01-24 NOTE — TELEPHONE ENCOUNTER
----- Message from Nurse Kwok sent at 1/16/2025  7:37 AM CST -----  Regarding: Dr. Roberts 01/27/2025 6 mth 1:00pm  Are there any outstanding tasks in chart? No    Is there any documentation of tasks? No    Has the pt seen another physician, been to ER, UCC, or admitted to hospital since last visit?    Has the pt done blood work or imaging since last visit?     5. PLEASE HAVE PATIENT BRING MEDICATION LIST OR BOTTLES TO EVERY OFFICE VISIT

## 2025-01-27 ENCOUNTER — OFFICE VISIT (OUTPATIENT)
Dept: INTERNAL MEDICINE | Facility: CLINIC | Age: 70
End: 2025-01-27
Payer: MEDICARE

## 2025-01-27 VITALS
BODY MASS INDEX: 23.92 KG/M2 | HEART RATE: 89 BPM | SYSTOLIC BLOOD PRESSURE: 128 MMHG | HEIGHT: 63 IN | WEIGHT: 135 LBS | DIASTOLIC BLOOD PRESSURE: 82 MMHG | OXYGEN SATURATION: 98 %

## 2025-01-27 DIAGNOSIS — R68.89 FORGETFULNESS: ICD-10-CM

## 2025-01-27 DIAGNOSIS — M85.80 OSTEOPENIA, UNSPECIFIED LOCATION: ICD-10-CM

## 2025-01-27 DIAGNOSIS — G47.00 INSOMNIA, UNSPECIFIED TYPE: ICD-10-CM

## 2025-01-27 DIAGNOSIS — E03.9 HYPOTHYROIDISM, UNSPECIFIED TYPE: ICD-10-CM

## 2025-01-27 DIAGNOSIS — I10 PRIMARY HYPERTENSION: ICD-10-CM

## 2025-01-27 PROCEDURE — 99214 OFFICE O/P EST MOD 30 MIN: CPT | Mod: ,,, | Performed by: INTERNAL MEDICINE

## 2025-01-27 NOTE — PROGRESS NOTES
"Patient ID: Collette Lagunas is a 69 y.o. female.  Chief Complaint: Follow-up (6 mth )    HPI:     70 yo male  hypothyroism , Hbp , ch insomnia  ,  lots of stress , I recommend  luciano crawford , , she is concern about  forgetfulness , has appoint with MRI brain .  Advic e folic   b complex     Current Outpatient Medications:     amoxicillin (AMOXIL) 500 MG Tab, as needed., Disp: , Rfl:     levothyroxine (SYNTHROID) 25 MCG tablet, Take 1 tablet (25 mcg total) by mouth once daily., Disp: , Rfl:     losartan (COZAAR) 25 MG tablet, TAKE 1 TABLET BY MOUTH EVERY DAY, Disp: 90 tablet, Rfl: 3    multivitamin-folic acid-biotin (HAIR-SKIN-NAILS, MV-FA-BIOTIN,) 400-2,000 mcg Tab, Take by mouth., Disp: , Rfl:     SEMAGLUTIDE ORAL, once daily., Disp: , Rfl:   ROS:   Constitutional: No weight gain, No fever, No chills, No fatigue.   Eyes: No blurring, No visual disturbances.   Ear/Nose/Mouth/Throat: No decreased hearing, No ear pain, No nasal congestion, No sore throat.   Respiratory: No shortness of breath, No cough, No wheezing.   Cardiovascular: No chest pain, No palpitations, No peripheral edema.   Gastrointestinal: No nausea, No vomiting, No diarrhea, No constipation, No abdominal pain.   Genitourinary: No dysuria, No hematuria.   Hematology/Lymphatics: No bruising tendency, No bleeding tendency, No swollen lymph glands.   Endocrine: No excessive thirst, No polyuria, No excessive hunger.   Musculoskeletal: No joint pain, No muscle pain, No decreased range of motion.   Integumentary: No rash, No pruritus.   Neurologic: No abnormal balance, No confusion, No headache. Insomnia    Psychiatric: No anxiety, No depression, Not suicidal, No hallucinations.    PE/Vitals:   /82 (BP Location: Left arm, Patient Position: Sitting)   Pulse 89   Ht 5' 3" (1.6 m)   Wt 61.2 kg (135 lb)   SpO2 98%   BMI 23.91 kg/m²   General: Alert and oriented, No acute distress.   Eye: Normal conjunctiva without exudate.  HENMT: " Normocephalic/AT, Normal hearing, Oral mucosa is moist and pink   Neck: No goiter visualized.   Respiratory: Lungs CTAB, Respirations are non-labored, Breath sounds are equal, Symmetrical chest wall expansion.  Cardiovascular: Normal rate, Regular rhythm, No murmur, No edema.   Gastrointestinal: Non-distended.   Genitourinary: Deferred.  Musculoskeletal: Normal ROM, Normal gait, No deformities or amputations.  Integumentary: Warm, Dry, Intact. No diaphoresis, or flushing.  Neurologic: No focal deficits, Cranial Nerves II-XII are grossly intact.   Psychiatric: Cooperative, Appropriate mood & affect, Normal judgment, Non-suicidal.  Assessment/Plan   1. Insomnia, unspecified type  Comments:  trial ashgaganda  and  Mag etherate    2. Primary hypertension  Comments:  stable on meds  asymptomatic    3. Hypothyroidism, unspecified type  Comments:  stable on meds  laast  levels  reviewed    4. Osteopenia, unspecified location  Comments:  continue  same   Repeat BDT in 3 years    5. Forgetfulness  Comments:  add folic acid  and  B complex  and  l tyrosine      No orders of the defined types were placed in this encounter.    Education and counseling done face to face regarding medical conditions and plan. Contact office if new symptoms develop. Should any symptoms ever significantly worsen seek emergency medical attention/go to ER. Follow up at least yearly for wellness or sooner PRN. Nurse to call patient with any results. The patient is receptive, expresses understanding and is agreeable to plan. All questions have been answered.  Follow up in about 6 months (around 7/27/2025).

## 2025-02-17 ENCOUNTER — OFFICE VISIT (OUTPATIENT)
Dept: NEUROLOGY | Facility: CLINIC | Age: 70
End: 2025-02-17
Payer: MEDICARE

## 2025-02-17 VITALS
WEIGHT: 130 LBS | BODY MASS INDEX: 23.04 KG/M2 | HEIGHT: 63 IN | DIASTOLIC BLOOD PRESSURE: 72 MMHG | SYSTOLIC BLOOD PRESSURE: 136 MMHG

## 2025-02-17 DIAGNOSIS — G31.84 MILD COGNITIVE IMPAIRMENT: Primary | ICD-10-CM

## 2025-02-17 DIAGNOSIS — F41.9 ANXIETY: ICD-10-CM

## 2025-02-17 DIAGNOSIS — G47.09 OTHER INSOMNIA: ICD-10-CM

## 2025-02-17 PROBLEM — F03.90 DEMENTIA: Status: ACTIVE | Noted: 2025-02-17

## 2025-02-17 PROCEDURE — 99213 OFFICE O/P EST LOW 20 MIN: CPT | Mod: PBBFAC | Performed by: NURSE PRACTITIONER

## 2025-02-17 RX ORDER — SERTRALINE HYDROCHLORIDE 50 MG/1
TABLET, FILM COATED ORAL
Qty: 30 TABLET | Refills: 5 | Status: SHIPPED | OUTPATIENT
Start: 2025-02-17

## 2025-02-17 NOTE — PROGRESS NOTES
New Neurology Patient     SUBJECTIVE:  Patient ID: Collette Lagunas   69 y.o.     Past Medical History:   Diagnosis Date    Degenerative joint disease of knee     Fatigue     HTN (hypertension)     Hypothyroidism, unspecified     Osteopenia     Personal history of colonic polyps     Vitamin B deficiency      Past Surgical History:   Procedure Laterality Date    BLEPHAROPLASTY, LOWER EYELID Bilateral 11/1/2024    Procedure: BLEPHAROPLASTY, LOWER EYELID;  Surgeon: Nathan Groves MD;  [address];  Service: ENT;  Laterality: Bilateral;    BLEPHAROPLASTY, UPPER EYELID Bilateral 11/1/2024    Procedure: BLEPHAROPLASTY, UPPER EYELID;  Surgeon: Nathan Groves MD;  [address];  Service: ENT;  Laterality: Bilateral;    BROW LIFT N/A 11/1/2024    Procedure: All Cosmetic- Endoscopic Browlift with Jocy, Bilateral Upper Blephs, Bilateral Lower Blephs - *Endotines Required*;  Surgeon: Nathan Groves MD;  Location: [address];  Service: ENT;  Laterality: N/A;    COLONOSCOPY  10/13/2020    Dr Roni Brown    EYE SURGERY Bilateral 2004    Lasik surgery    JOINT REPLACEMENT Bilateral February 2021    Full knee replacement     Review of patient's allergies indicates:   Allergen Reactions    Sulfa (sulfonamide antibiotics)      Other reaction(s): Unknown       Chief Complaint: New Patient referred by Dr. Joel Roberts for Neurologic consultation to evaluate cognitive complaints     History of Present Illness:    New Patient referred by Dr. Joel Roberts for Neurologic consultation to evaluate cognitive complaints      Pt reports over the past 6mn-1yr of decline in memory   - recall of names of people, movies, and things that she typically would recall instantly  - states that she seems to have become more fearful of driving, and more dependent on [employer]   - repeats herself multiple times daily  - Unable to recall that she went to store yesterday (for example)    From  "[place]    ADL's:   - Living situation: Lives at home with . Also has a home in [place]    - Driving:   does drive. 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 Children havent voiced concerns about memory    Finances:   - Able to manage finances on own:  does all the accounting    Appetite:  - Good/stable.   - On Semiglutide. Trying to loose weight    Mood:   - Depression:  no   - Anxiety:  Feels she does have high anxiety   - Anger/ agitation: no     Safety concerns:   - Alcohol or drug use:  no     Sleep:  - Sleep well at night: admits having diff with sleep onset and maintenance and does states that her mind does race at night. "If I get 5 hrs of sleep nightly, Im cassie"   - Snore: rarely    Family history of dementia: mother (mid 60's)      Review of Systems - as per HPI, otherwise a balanced 10 systems review is negative.      Current Medications:  Current Outpatient Medications   Medication Instructions    cyanocobalamin, vitamin B-12, (VITAMIN B-12 INJ) Inject as directed.    levothyroxine (SYNTHROID) 25 mcg, Oral, Daily    losartan (COZAAR) 25 MG tablet TAKE 1 TABLET BY MOUTH EVERY DAY    multivitamin-folic acid-biotin (HAIR-SKIN-NAILS, MV-FA-BIOTIN,) 400-2,000 mcg Tab Take by mouth.    SEMAGLUTIDE ORAL Daily    sertraline (ZOLOFT) 50 MG tablet Week 1: take 1/2 tab daily, week 2 and thereafter: 1 tab daily       OBJECTIVE:  Vitals:  /72   Ht 5' 3" (1.6 m)   Wt 59 kg (130 lb)   BMI 23.03 kg/m²       General Exam  Accompanied by -   appearance - well appearing, no apparent distress   oropharynx - Mallampati score class 3, ok dentition  heart - " regular rate and rhythm auscultated   skin- no obvious lesions noted    Neurological  cortical function - The patient is alert, attentive, and MoCA score: 21/30; cooperative with exam, good eye contact  Speech - clear, some repetition  cranial nerves:  CN 2 - visual fields are full to confrontation.Pupils are equal and react to light  CN 3, 4, 6 EOMs - normal. No ptosis or lateral gaze deviation  CN 7 - no face asymmetry; normal eye closure and smile  CN 8 - hearing is grossly normal  CN 9, 10, 11- palate elevates symmetrically. Shoulder shrug and head turn intact  CN 12 tongue - protrudes midline with normal movements and no atrophy  motor - No pronator drift. Muscle bulk and tone normal. Arose from chair with arms folded. Able to walk on tip toes and heels. No lateralizing or focal deficits noted  gait - unassisted, posture upright. gait is steady with normal steps, arm swing and turning. Tandem normal.  reflexes - 2+ and symmetric at knees and absent ankles  tone - normal  sensation - vib and light touch intact  coordination - finger to nose intact. Romberg absent    Review of Data:   Outside/ prior notes reviewed     Labs:  7/18/24 TSH:  1.4  1/12/24 Vit B 12: 764    Imaging:  No results found for this or any previous visit.      ASSESSMENT/ PLAN:    Problem List Items Addressed This Visit             Mild cognitive impairment       Will order MRI brain    Reviewed meds for high anticholinergic burden or meds known to cause memory impairment. Avoid Benadryl      Prioritizing tasks and activities is important; This will help focus on essential activities, reduce confusion/ forgetfulness and minimize stress. It will also ensure critical needs are addressed promptly and enhance quality of life     F/u in 6-8 wks         Anxiety    Start Zoloft 50mg, Week 1: take 1/2 tab daily, week 2 and thereafter: 1 tab daily   Anticipated response and possible side effects of new medication discussed. Pt denies questions at this  time. Instructed pt to call with any questions or concerns          Other    Insomnia    Sleep hygiene discussed  Hopeful that Zoloft will help insomnia (since anxiety keeps her up at night)           Items discussed include acute and/or chronic neurological, sleep, or other issues and their attendant differential diagnoses.  Potential for additional testing, treatment options, and prognosis also discussed.  Questions and concerns were sought and answered to the patient's stated verbal satisfaction.    The patient verbalizes understanding and agreement with the above stated treatment plan.       ___single dx _**__multiple issues/ diagnoses  ___ low __mod _*__ high complexity of data  ___low __mod ___* high risks     Medical Decision Making (MDM) used for CPT choice:  ___low  ___moderate  _*___high        HALEY Herrera  [facility]  [phone]

## 2025-02-18 DIAGNOSIS — E03.9 HYPOTHYROIDISM, UNSPECIFIED TYPE: ICD-10-CM

## 2025-02-18 RX ORDER — LEVOTHYROXINE SODIUM 25 UG/1
25 TABLET ORAL
Qty: 90 TABLET | Refills: 2 | Status: SHIPPED | OUTPATIENT
Start: 2025-02-18

## 2025-03-05 ENCOUNTER — PATIENT MESSAGE (OUTPATIENT)
Dept: INTERNAL MEDICINE | Facility: CLINIC | Age: 70
End: 2025-03-05
Payer: MEDICARE

## 2025-03-06 ENCOUNTER — PATIENT MESSAGE (OUTPATIENT)
Dept: NEUROLOGY | Facility: CLINIC | Age: 70
End: 2025-03-06
Payer: MEDICARE

## 2025-03-06 ENCOUNTER — HOSPITAL ENCOUNTER (OUTPATIENT)
Dept: RADIOLOGY | Facility: HOSPITAL | Age: 70
Discharge: HOME | End: 2025-03-06
Attending: NURSE PRACTITIONER
Payer: MEDICARE

## 2025-03-06 DIAGNOSIS — G31.84 MILD COGNITIVE IMPAIRMENT: ICD-10-CM

## 2025-03-06 PROCEDURE — 70551 MRI BRAIN STEM W/O DYE: CPT | Mod: TC

## 2025-04-01 ENCOUNTER — PATIENT MESSAGE (OUTPATIENT)
Dept: INTERNAL MEDICINE | Facility: CLINIC | Age: 70
End: 2025-04-01
Payer: MEDICARE

## 2025-04-21 ENCOUNTER — PATIENT MESSAGE (OUTPATIENT)
Dept: INTERNAL MEDICINE | Facility: CLINIC | Age: 70
End: 2025-04-21
Payer: MEDICARE

## 2025-04-22 ENCOUNTER — OFFICE VISIT (OUTPATIENT)
Dept: NEUROLOGY | Facility: CLINIC | Age: 70
End: 2025-04-22
Payer: MEDICARE

## 2025-04-22 DIAGNOSIS — F03.A4 MILD DEMENTIA WITH ANXIETY, UNSPECIFIED DEMENTIA TYPE: Primary | ICD-10-CM

## 2025-04-22 DIAGNOSIS — F02.A4 MILD DEMENTIA ASSOCIATED WITH OTHER UNDERLYING DISEASE, WITH ANXIETY: ICD-10-CM

## 2025-04-22 DIAGNOSIS — F41.9 ANXIETY: ICD-10-CM

## 2025-04-22 PROCEDURE — 98007 SYNCH AUDIO-VIDEO EST HI 40: CPT | Mod: 95,,, | Performed by: NURSE PRACTITIONER

## 2025-04-22 NOTE — PROGRESS NOTES
Telemedicine Visit     SUBJECTIVE:  Patient ID: Collette Lagunas is a 70 y.o. female.  MRN: [id number]    Past Medical History:   Diagnosis Date    Degenerative joint disease of knee     Fatigue     HTN (hypertension)     Hypothyroidism, unspecified     Osteopenia     Personal history of colonic polyps     Vitamin B deficiency        Past Surgical History:   Procedure Laterality Date    BLEPHAROPLASTY, LOWER EYELID Bilateral 11/1/2024    Procedure: BLEPHAROPLASTY, LOWER EYELID;  Surgeon: Nathan Groves MD;  [address];  Service: ENT;  Laterality: Bilateral;    BLEPHAROPLASTY, UPPER EYELID Bilateral 11/1/2024    Procedure: BLEPHAROPLASTY, UPPER EYELID;  Surgeon: Nathan Groves MD;  [address];  Service: ENT;  Laterality: Bilateral;    BROW LIFT N/A 11/1/2024    Procedure: All Cosmetic- Endoscopic Browlift with Jocy, Bilateral Upper Blephs, Bilateral Lower Blephs - *Endotines Required*;  Surgeon: Nathan Groves MD;  Location: [address];  Service: ENT;  Laterality: N/A;    COLONOSCOPY  10/13/2020    Dr Rnoi Brown    EYE SURGERY Bilateral 2004    Lasik surgery    JOINT REPLACEMENT Bilateral February 2021    Full knee replacement       Review of patient's allergies indicates:   Allergen Reactions    Sulfa (sulfonamide antibiotics)      Other reaction(s): Unknown       Chief Complaint:  Cognitive impairment follow-up    History of Present Illness:   Telemedicine visit for Cognitive impairment follow-up    Last office visit we ordered MRI brain and started Zoloft  - Did not start Zoloft. Read about medication and fearful of side effects    Reports memory is the same as last visit    Asking about additional testing.  Her mother had dementia in mid 60s and patient feels she has similar symptoms as her mother       Current Medications:  Current Medications[1]    Review of Systems - as per HPI, otherwise a balanced 10 systems review is negative.    This is a telemedicine  note; the patient was treated using telemedicine, real time audio and video, according to [facility] protocols. Shayy KWAN, (distant provider) conducted the visit from the location identified below. The patient participated in the visit at a non-[facility] location selected by the patient (or patient's representative), identified below. I am licensed in the state where the patient stated they are located. The patient (or patient's representative) stated that they understood and accepted the privacy and security risks to their information at their location.    Patient was located at their house  Shayy KWAN (distant provider), was located at [facility]       OBJECTIVE:   (Limited d/t telemed visit)  Well nourished, no distress  Alert, oriented  Calm, well spoken; normal affect and mood  Good eye contact  No lesions noted on face or arms    PLAN:  Problem List Items Addressed This Visit          Neuro    Dementia     Summers LOV 21/30    Reviewed MRI brain results; mild age-related change.   Other diagnostic testing specifically for AD: Serum labs- P tau 217, CSF analysis of AD biomarkers (The validated AD CSF biomarkers, A?1-42, T-tau, and [id number], have an added value in the (differential) diagnosis of AD and related disorders) and Amyvid scan of the brain to estimate beta-amyloid plaque density  - Will order Serum labs- P tau 217    With suspicious last visit that anxiety was playing a role in memory/ cognitive decline.   Encouraged patient to try Zoloft  Will repeat Summers at next office visit, after on Zoloft     Follow-up in 3 months in clinic         Psychiatric    Anxiety    Start Zoloft 50mg, Week 1: take 1/2 tab daily, week 2 and thereafter: 1 tab daily   Questions answered about medication  Patient seemed comfortable about starting Zoloft           Face to face time was spent with patient for education and counseling regarding: medical conditions, current medications including  risk/ benefit and side effect/ adverse events, over the counter medications- uses/doses, home self-care and contact precautions, and red flags and indications for immediate medical attention. The patient is receptive, expresses understanding and is agreeable to the discussed plan. All of the patient's questions were answered.     __single dx _*__multiple issues/ diagnoses  __ low __mod _*__ high complexity of data  __low __mod *___ high risks     Medical Decision Making (MDM) used for CPT choice:  ___low  ___moderate  _*___high        HALEY Herrera  [facility]  [phone]      .ksrad       [1]   Current Outpatient Medications:     cyanocobalamin, vitamin B-12, (VITAMIN B-12 INJ), Inject as directed., Disp: , Rfl:     levothyroxine (SYNTHROID) 25 MCG tablet, TAKE 1 TABLET BY MOUTH EVERY DAY, Disp: 90 tablet, Rfl: 2    losartan (COZAAR) 25 MG tablet, TAKE 1 TABLET BY MOUTH EVERY DAY, Disp: 90 tablet, Rfl: 3    multivitamin-folic acid-biotin (HAIR-SKIN-NAILS, MV-FA-BIOTIN,) 400-2,000 mcg Tab, Take by mouth., Disp: , Rfl:     SEMAGLUTIDE ORAL, once daily., Disp: , Rfl:     sertraline (ZOLOFT) 50 MG tablet, Week 1: take 1/2 tab daily, week 2 and thereafter: 1 tab daily, Disp: 30 tablet, Rfl: 5

## 2025-04-28 ENCOUNTER — TELEPHONE (OUTPATIENT)
Dept: INTERNAL MEDICINE | Facility: CLINIC | Age: 70
End: 2025-04-28
Payer: MEDICARE

## 2025-04-28 NOTE — TELEPHONE ENCOUNTER
----- Message from Jessica sent at 4/28/2025  8:02 AM CDT -----  Who Called: Collette Mckeon is requesting assistance/information from provider's office.Symptoms (please be specific): n/a How long has patient had these symptoms:  n/aList of preferred pharmacies on file (remove unneeded):n/aPreferred Method of Contact: Phone CallPatient's Preferred Phone Number on File: [phone] Best Call Back Number, if different:Additional Information: Calling to check on status of semaglutide. Pt stated that she has not had it for 3 months. Please advise.

## 2025-05-05 ENCOUNTER — PATIENT MESSAGE (OUTPATIENT)
Dept: INTERNAL MEDICINE | Facility: CLINIC | Age: 70
End: 2025-05-05

## 2025-05-05 ENCOUNTER — OFFICE VISIT (OUTPATIENT)
Dept: INTERNAL MEDICINE | Facility: CLINIC | Age: 70
End: 2025-05-05
Payer: MEDICARE

## 2025-05-05 VITALS
OXYGEN SATURATION: 97 % | HEART RATE: 95 BPM | WEIGHT: 120 LBS | SYSTOLIC BLOOD PRESSURE: 120 MMHG | HEIGHT: 63 IN | BODY MASS INDEX: 21.26 KG/M2 | DIASTOLIC BLOOD PRESSURE: 78 MMHG

## 2025-05-05 DIAGNOSIS — R63.4 WEIGHT LOSS: ICD-10-CM

## 2025-05-05 DIAGNOSIS — E03.9 HYPOTHYROIDISM, UNSPECIFIED TYPE: ICD-10-CM

## 2025-05-05 DIAGNOSIS — I10 PRIMARY HYPERTENSION: ICD-10-CM

## 2025-05-05 DIAGNOSIS — M81.0 OSTEOPOROSIS, UNSPECIFIED OSTEOPOROSIS TYPE, UNSPECIFIED PATHOLOGICAL FRACTURE PRESENCE: ICD-10-CM

## 2025-05-05 DIAGNOSIS — H81.13 BENIGN PAROXYSMAL POSITIONAL VERTIGO DUE TO BILATERAL VESTIBULAR DISORDER: ICD-10-CM

## 2025-05-05 PROCEDURE — 99214 OFFICE O/P EST MOD 30 MIN: CPT | Mod: ,,, | Performed by: INTERNAL MEDICINE

## 2025-05-05 NOTE — PROGRESS NOTES
Patient ID: Collette Lagunas is a 70 y.o. female.  Chief Complaint: Other Misc (vertigo)    HPI:   History of Present Illness      71 yo female  To the urgent care due to palpitations and lightheadedness, in the recent past he has been started on sertraline 25 mg that slowly on that day of the event was increase to 50 mg.  My recommendations would be to skip it at 25 mg of the sertraline due to known and common side effects of dizziness and palpitations.  Blood pressure log that she rates to me are within normal limits will continue the losartan   Patient on compound product of semaglutide with significant weight loss, my advice would be to go back to the 0.25 for now hopefully symptoms with no recur   Lengthy conversation with the patient about increasing high caloric protein intake, chart review 3 years ago the last bone density test shows significant osteoporosis is x2 repeated patient advised to continue her exercise program and weightlifting         Current Medications[1]  ROS:   Constitutional: No weight gain, No fever, No chills, No fatigue.   Eyes: No blurring, No visual disturbances.   Ear/Nose/Mouth/Throat: No decreased hearing, No ear pain, No nasal congestion, No sore throat. dizzyness  Respiratory: No shortness of breath, No cough, No wheezing.   Cardiovascular: No chest pain, No palpitations, No peripheral edema.   Gastrointestinal: No nausea, No vomiting, No diarrhea, No constipation, No abdominal pain.   Genitourinary: No dysuria, No hematuria.   Hematology/Lymphatics: No bruising tendency, No bleeding tendency, No swollen lymph glands.   Endocrine: No excessive thirst, No polyuria, No excessive hunger.   Musculoskeletal: No joint pain, No muscle pain, No decreased range of motion.   Integumentary: No rash, No pruritus.   Neurologic: No abnormal balance, No confusion, No headache.   Psychiatric: No anxiety, No depression, Not suicidal, No hallucinations.    PE/Vitals:   /78 (BP Location: Left  "arm, Patient Position: Sitting)   Pulse 95   Ht 5' 3" (1.6 m)   Wt 54.4 kg (120 lb)   SpO2 97%   BMI 21.26 kg/m²   General: Alert and oriented, No acute distress.   Eye: Normal conjunctiva without exudate.  HENMT: Normocephalic/AT, Normal hearing, Oral mucosa is moist and pink   Neck: No goiter visualized.   Respiratory: Lungs CTAB, Respirations are non-labored, Breath sounds are equal, Symmetrical chest wall expansion.  Cardiovascular: Normal rate, Regular rhythm, No murmur, No edema.   Gastrointestinal: Non-distended.   Genitourinary: Deferred.  Musculoskeletal: Normal ROM, Normal gait, No deformities or amputations.  Integumentary: Warm, Dry, Intact. No diaphoresis, or flushing.  Neurologic: No focal deficits, Cranial Nerves II-XII are grossly intact.   Psychiatric: Cooperative, Appropriate mood & affect, Normal judgment, Non-suicidal.  Assessment/Plan   Assessment & Plan             1. Benign paroxysmal positional vertigo due to bilateral vestibular disorder  Comments:  on meclizine  by Urgent care    2. Hypothyroidism, unspecified type  Comments:  on thyroid  supplements    3. Primary hypertension  Comments:  120/78 stable    4. Weight loss  Comments:  on compound product , I recommend to stop  , she wants to continue    5. Osteoporosis, unspecified osteoporosis type, unspecified pathological fracture presence      No orders of the defined types were placed in this encounter.    Education and counseling done face to face regarding medical conditions and plan. Contact office if new symptoms develop. Should any symptoms ever significantly worsen seek emergency medical attention/go to ER. Follow up at least yearly for wellness or sooner PRN. Nurse to call patient with any results. The patient is receptive, expresses understanding and is agreeable to plan. All questions have been answered.  No follow-ups on file.  This note was generated with the assistance of ambient listening technology. Verbal consent was " obtained by the patient and accompanying visitor(s) for the recording of patient appointment to facilitate this note. I attest to having reviewed and edited the generated note for accuracy, though some syntax or spelling errors may persist. Please contact the author of this note for any clarification.            [1]   Current Outpatient Medications:     cyanocobalamin, vitamin B-12, (VITAMIN B-12 INJ), Inject as directed., Disp: , Rfl:     levothyroxine (SYNTHROID) 25 MCG tablet, TAKE 1 TABLET BY MOUTH EVERY DAY, Disp: 90 tablet, Rfl: 2    losartan (COZAAR) 25 MG tablet, TAKE 1 TABLET BY MOUTH EVERY DAY, Disp: 90 tablet, Rfl: 3    multivitamin-folic acid-biotin (HAIR-SKIN-NAILS, MV-FA-BIOTIN,) 400-2,000 mcg Tab, Take by mouth., Disp: , Rfl:     semaglutide (OZEMPIC) 2 mg/dose (8 mg/3 mL) PnIj, Inject 2 mg into the skin., Disp: , Rfl:     sertraline (ZOLOFT) 50 MG tablet, Week 1: take 1/2 tab daily, week 2 and thereafter: 1 tab daily, Disp: 30 tablet, Rfl: 5    SEMAGLUTIDE ORAL, once daily. (Patient not taking: Reported on 5/5/2025), Disp: , Rfl:

## 2025-05-20 ENCOUNTER — PATIENT MESSAGE (OUTPATIENT)
Dept: INTERNAL MEDICINE | Facility: CLINIC | Age: 70
End: 2025-05-20
Payer: MEDICARE

## 2025-05-21 ENCOUNTER — PATIENT MESSAGE (OUTPATIENT)
Dept: INTERNAL MEDICINE | Facility: CLINIC | Age: 70
End: 2025-05-21
Payer: MEDICARE

## 2025-05-28 ENCOUNTER — TELEPHONE (OUTPATIENT)
Dept: INTERNAL MEDICINE | Facility: CLINIC | Age: 70
End: 2025-05-28
Payer: MEDICARE

## 2025-05-28 NOTE — TELEPHONE ENCOUNTER
Copied from CRM #[id number]. Topic: General Inquiry - Patient Advice  >> May 28, 2025  2:10 PM Jessica wrote:  [facility]    What order is the patient requesting: bone density   When does the expect the orders to be performed? [facility]        Preferred Method of Contact: Phone Call  Patient's Preferred Phone Number on File: [phone]   Best Call Back Number, if different:[phone] opt 3  Additional Information:

## 2025-06-03 ENCOUNTER — PATIENT MESSAGE (OUTPATIENT)
Dept: INTERNAL MEDICINE | Facility: CLINIC | Age: 70
End: 2025-06-03
Payer: MEDICARE

## 2025-06-11 ENCOUNTER — RESULTS FOLLOW-UP (OUTPATIENT)
Dept: INTERNAL MEDICINE | Facility: CLINIC | Age: 70
End: 2025-06-11

## 2025-06-16 ENCOUNTER — TELEPHONE (OUTPATIENT)
Dept: INTERNAL MEDICINE | Facility: CLINIC | Age: 70
End: 2025-06-16
Payer: MEDICARE

## 2025-06-20 ENCOUNTER — PATIENT MESSAGE (OUTPATIENT)
Dept: INTERNAL MEDICINE | Facility: CLINIC | Age: 70
End: 2025-06-20
Payer: MEDICARE

## 2025-06-20 DIAGNOSIS — E11.9 TYPE 2 DIABETES MELLITUS WITHOUT COMPLICATION, WITHOUT LONG-TERM CURRENT USE OF INSULIN: Primary | ICD-10-CM

## 2025-07-08 ENCOUNTER — TELEPHONE (OUTPATIENT)
Dept: INTERNAL MEDICINE | Facility: CLINIC | Age: 70
End: 2025-07-08
Payer: MEDICARE

## 2025-07-08 ENCOUNTER — PATIENT MESSAGE (OUTPATIENT)
Dept: INTERNAL MEDICINE | Facility: CLINIC | Age: 70
End: 2025-07-08
Payer: MEDICARE

## 2025-07-08 DIAGNOSIS — Z01.818 PRE-OP TESTING: ICD-10-CM

## 2025-07-08 DIAGNOSIS — E03.9 HYPOTHYROIDISM, UNSPECIFIED TYPE: ICD-10-CM

## 2025-07-08 DIAGNOSIS — I10 PRIMARY HYPERTENSION: ICD-10-CM

## 2025-07-08 DIAGNOSIS — M85.80 OSTEOPENIA, UNSPECIFIED LOCATION: ICD-10-CM

## 2025-07-17 ENCOUNTER — TELEPHONE (OUTPATIENT)
Dept: INTERNAL MEDICINE | Facility: CLINIC | Age: 70
End: 2025-07-17
Payer: MEDICARE

## 2025-07-17 NOTE — TELEPHONE ENCOUNTER
"----- Message from Nurse Kwok sent at 7/16/2025  2:06 PM CDT -----  Regarding: Dr. Roberts 07/25/2025 wellness 0940  Are there any outstanding tasks in chart? No, but needs FASTING labs, TO BE DONE AT  "[facility]" or lab location of choice PRIOR to appt    Is there any documentation of tasks? no    Has the pt seen another physician, been to ER, UCC, or admitted to hospital since last visit?    Has the pt done blood work or imaging since last visit?    5. PLEASE HAVE PATIENT BRING MEDICATION LIST OR BOTTLES TO EVERY OFFICE VISIT  "

## 2025-07-22 ENCOUNTER — LAB VISIT (OUTPATIENT)
Dept: LAB | Facility: HOSPITAL | Age: 70
End: 2025-07-22
Attending: INTERNAL MEDICINE
Payer: MEDICARE

## 2025-07-22 ENCOUNTER — TELEPHONE (OUTPATIENT)
Dept: NEUROLOGY | Facility: CLINIC | Age: 70
End: 2025-07-22

## 2025-07-22 ENCOUNTER — OFFICE VISIT (OUTPATIENT)
Dept: NEUROLOGY | Facility: CLINIC | Age: 70
End: 2025-07-22
Payer: MEDICARE

## 2025-07-22 DIAGNOSIS — M85.80 OSTEOPENIA, UNSPECIFIED LOCATION: ICD-10-CM

## 2025-07-22 DIAGNOSIS — I10 PRIMARY HYPERTENSION: ICD-10-CM

## 2025-07-22 DIAGNOSIS — Z01.818 PRE-OP TESTING: ICD-10-CM

## 2025-07-22 DIAGNOSIS — F41.9 ANXIETY: ICD-10-CM

## 2025-07-22 DIAGNOSIS — F03.A4 MILD DEMENTIA WITH ANXIETY, UNSPECIFIED DEMENTIA TYPE: Primary | ICD-10-CM

## 2025-07-22 DIAGNOSIS — E03.9 HYPOTHYROIDISM, UNSPECIFIED TYPE: ICD-10-CM

## 2025-07-22 LAB
ALBUMIN SERPL-MCNC: 4 G/DL (ref 3.4–4.8)
ALBUMIN/GLOB SERPL: 1.3 RATIO (ref 1.1–2)
ALP SERPL-CCNC: 64 UNIT/L (ref 40–150)
ALT SERPL-CCNC: 9 UNIT/L (ref 0–55)
ANION GAP SERPL CALC-SCNC: 9 MEQ/L
AST SERPL-CCNC: 15 UNIT/L (ref 11–45)
BASOPHILS # BLD AUTO: 0.03 X10(3)/MCL
BASOPHILS NFR BLD AUTO: 0.7 %
BILIRUB SERPL-MCNC: 0.6 MG/DL
BUN SERPL-MCNC: 10 MG/DL (ref 9.8–20.1)
CALCIUM SERPL-MCNC: 9 MG/DL (ref 8.4–10.2)
CHLORIDE SERPL-SCNC: 102 MMOL/L (ref 98–107)
CHOLEST SERPL-MCNC: 176 MG/DL
CHOLEST/HDLC SERPL: 2 {RATIO} (ref 0–5)
CO2 SERPL-SCNC: 24 MMOL/L (ref 23–31)
CREAT SERPL-MCNC: 0.7 MG/DL (ref 0.55–1.02)
CREAT/UREA NIT SERPL: 14
EOSINOPHIL # BLD AUTO: 0.1 X10(3)/MCL (ref 0–0.9)
EOSINOPHIL NFR BLD AUTO: 2.3 %
ERYTHROCYTE [DISTWIDTH] IN BLOOD BY AUTOMATED COUNT: 12.6 % (ref 11.5–17)
GFR SERPLBLD CREATININE-BSD FMLA CKD-EPI: >60 ML/MIN/1.73/M2
GLOBULIN SER-MCNC: 3.2 GM/DL (ref 2.4–3.5)
GLUCOSE SERPL-MCNC: 91 MG/DL (ref 82–115)
HCT VFR BLD AUTO: 35.7 % (ref 37–47)
HDLC SERPL-MCNC: 93 MG/DL (ref 35–60)
HGB BLD-MCNC: 11.8 G/DL (ref 12–16)
IMM GRANULOCYTES # BLD AUTO: 0.01 X10(3)/MCL (ref 0–0.04)
IMM GRANULOCYTES NFR BLD AUTO: 0.2 %
LDLC SERPL CALC-MCNC: 76 MG/DL (ref 50–140)
LYMPHOCYTES # BLD AUTO: 1.55 X10(3)/MCL (ref 0.6–4.6)
LYMPHOCYTES NFR BLD AUTO: 36.4 %
MCH RBC QN AUTO: 30.7 PG (ref 27–31)
MCHC RBC AUTO-ENTMCNC: 33.1 G/DL (ref 33–36)
MCV RBC AUTO: 93 FL (ref 80–94)
MONOCYTES # BLD AUTO: 0.43 X10(3)/MCL (ref 0.1–1.3)
MONOCYTES NFR BLD AUTO: 10.1 %
NEUTROPHILS # BLD AUTO: 2.14 X10(3)/MCL (ref 2.1–9.2)
NEUTROPHILS NFR BLD AUTO: 50.3 %
NRBC BLD AUTO-RTO: 0 %
PLATELET # BLD AUTO: 235 X10(3)/MCL (ref 130–400)
PMV BLD AUTO: 8.7 FL (ref 7.4–10.4)
POTASSIUM SERPL-SCNC: 3.9 MMOL/L (ref 3.5–5.1)
PROT SERPL-MCNC: 7.2 GM/DL (ref 5.8–7.6)
RBC # BLD AUTO: 3.84 X10(6)/MCL (ref 4.2–5.4)
SODIUM SERPL-SCNC: 135 MMOL/L (ref 136–145)
TRIGL SERPL-MCNC: 33 MG/DL (ref 37–140)
TSH SERPL-ACNC: 1.54 UIU/ML (ref 0.35–4.94)
VLDLC SERPL CALC-MCNC: 7 MG/DL
WBC # BLD AUTO: 4.26 X10(3)/MCL (ref 4.5–11.5)

## 2025-07-22 PROCEDURE — 36415 COLL VENOUS BLD VENIPUNCTURE: CPT

## 2025-07-22 PROCEDURE — 80053 COMPREHEN METABOLIC PANEL: CPT

## 2025-07-22 PROCEDURE — 98007 SYNCH AUDIO-VIDEO EST HI 40: CPT | Mod: 95,,, | Performed by: NURSE PRACTITIONER

## 2025-07-22 PROCEDURE — 80061 LIPID PANEL: CPT

## 2025-07-22 PROCEDURE — 84443 ASSAY THYROID STIM HORMONE: CPT

## 2025-07-22 PROCEDURE — 85025 COMPLETE CBC W/AUTO DIFF WBC: CPT

## 2025-07-22 RX ORDER — SERTRALINE HYDROCHLORIDE 50 MG/1
TABLET, FILM COATED ORAL
Start: 2025-07-22

## 2025-07-22 NOTE — PROGRESS NOTES
Telemedicine Visit     SUBJECTIVE:  Patient ID: Collette Lagunas is a 70 y.o. female.  MRN: [id number]    Past Medical History:   Diagnosis Date    Degenerative joint disease of knee     Fatigue     HTN (hypertension)     Hypothyroidism, unspecified     Osteopenia     Personal history of colonic polyps     Vitamin B deficiency        Past Surgical History:   Procedure Laterality Date    BLEPHAROPLASTY, LOWER EYELID Bilateral 11/1/2024    Procedure: BLEPHAROPLASTY, LOWER EYELID;  Surgeon: Nathan Groves MD;  [address];  Service: ENT;  Laterality: Bilateral;    BLEPHAROPLASTY, UPPER EYELID Bilateral 11/1/2024    Procedure: BLEPHAROPLASTY, UPPER EYELID;  Surgeon: Nathan Groves MD;  [address];  Service: ENT;  Laterality: Bilateral;    BROW LIFT N/A 11/1/2024    Procedure: All Cosmetic- Endoscopic Browlift with Jocy, Bilateral Upper Blephs, Bilateral Lower Blephs - *Endotines Required*;  Surgeon: Nathan Groves MD;  Location: [address];  Service: ENT;  Laterality: N/A;    COLONOSCOPY  10/13/2020    Dr Roni Brown    EYE SURGERY Bilateral 2004    Lasik surgery    JOINT REPLACEMENT Bilateral February 2021    Full knee replacement       Review of patient's allergies indicates:   Allergen Reactions    Sulfa (sulfonamide antibiotics)      Other reaction(s): Unknown       History of Present Illness    CHIEF COMPLAINT:  Collette presents today for follow up of dementia symptoms.    COGNITIVE FUNCTION:  She reports ongoing short-term memory difficulties affecting daily activities, including confusion about recent events such as forgetting grocery store visits and previously scheduled tasks. She did not get P-ofd312 lab drawn (as ordered last OV) because she didn't recall us ordering the lab     ANXIETY:  LOV we started Zoloft 50mg daily. Recently decreased to half tablet daily after discussion with Dr. Roberts. Zoloft seems to be helpful for managing anxiety    SLEEP:  She  reports improved sleep quality with easier time falling asleep and deeper sleep. While she continues to experience nocturnal awakenings, she notes reduced intensity of intrusive thoughts.    APPETITE:  She maintains stable appetite despite being on semaglutide injection, which was recently decreased in dosage. She remains intentionally focused on maintaining regular eating patterns.    No safety concerns  Driving, without accidents or getting lost    Current Medications:  Current Medications[1]    Review of Systems - as per HPI, otherwise a balanced 10 systems review is negative.    This is a telemedicine note; the patient was treated using telemedicine, real time audio and video, according to [facility] protocols. Shayy KWAN, (distant provider) conducted the visit from the location identified below. The patient participated in the visit at a non-[facility] location selected by the patient (or patient's representative), identified below. I am licensed in the state where the patient stated they are located. The patient (or patient's representative) stated that they understood and accepted the privacy and security risks to their information at their location.    Patient was located at their house with  Pat  Shayy KWAN (distant provider), was located at [facility]       OBJECTIVE:   (Limited d/t telemed visit)  Well nourished, no distress  Alert, oriented  Calm, well spoken; normal affect and mood  Good eye contact  No lesions noted on face or arms    PLAN:  Assessment & Plan    F03.A4 Mild dementia with anxiety, unspecified dementia type  F41.9 Anxiety    F03.A4 MILD DEMENTIA WITH ANXIETY, UNSPECIFIED DEMENTIA TYPE:  - Evaluated cognitive function through basic orientation questions.  - Explained minor head injury in childhood not the cause of her dementia symptoms.   - Follow up in 6 months for full memory assessment.  - Ordered blood test for P-Tau 217 level.      F41.9  ANXIETY:  - Assessed response to Zoloft (sertraline) for anxiety management with improved sleep and reduced anxiety intensity reported.  - Continue Zoloft 25 mg (half tablet) daily.  - Current medication regimen appropriate for managing symptoms.      PLAN SUMMARY:  - Ordered blood test for P-Tau 217 level  - Continue Zoloft 25 mg (half tablet) daily  - Will call her with blood test results  - Follow up in 6 months for full memory assessment       This note was generated with the assistance of ambient listening technology. Verbal consent was obtained by the patient and accompanying visitor(s) for the recording of patient appointment to facilitate this note. I attest to having reviewed and edited the generated note for accuracy, though some syntax or spelling errors may persist. Please contact the author of this note for any clarification.    41 min spent  Face to face time was spent with patient for education and counseling regarding: medical conditions, current medications including risk/ benefit and side effect/ adverse events, over the counter medications- uses/doses, home self-care and contact precautions, and red flags and indications for immediate medical attention. The patient is receptive, expresses understanding and is agreeable to the discussed plan. All of the patient's questions were answered. Reviewed prior office notes. Coordinated care for pt        HALEY Herrera  [facility]  [phone]             [1]   Current Outpatient Medications:     cyanocobalamin, vitamin B-12, (VITAMIN B-12 INJ), Inject as directed., Disp: , Rfl:     levothyroxine (SYNTHROID) 25 MCG tablet, TAKE 1 TABLET BY MOUTH EVERY DAY, Disp: 90 tablet, Rfl: 2    losartan (COZAAR) 25 MG tablet, TAKE 1 TABLET BY MOUTH EVERY DAY, Disp: 90 tablet, Rfl: 3    multivitamin-folic acid-biotin (HAIR-SKIN-NAILS, MV-FA-BIOTIN,) 400-2,000 mcg Tab, Take by mouth., Disp: , Rfl:     semaglutide (OZEMPIC) 2 mg/dose (8 mg/3  mL) BandarIj, Inject 2 mg into the skin every 7 days., Disp: 1 each, Rfl: 6    sertraline (ZOLOFT) 50 MG tablet, take 1/2 tab daily, Disp: , Rfl:

## 2025-07-22 NOTE — TELEPHONE ENCOUNTER
----- Message from HALEY Herrera sent at 7/22/2025  9:29 AM CDT -----  Follow up in 6 months (in clinic) for memory assessment - Nurse to do MMSE

## 2025-07-24 ENCOUNTER — OFFICE VISIT (OUTPATIENT)
Dept: INTERNAL MEDICINE | Facility: CLINIC | Age: 70
End: 2025-07-24
Payer: MEDICARE

## 2025-07-24 VITALS
SYSTOLIC BLOOD PRESSURE: 122 MMHG | BODY MASS INDEX: 19.49 KG/M2 | OXYGEN SATURATION: 98 % | DIASTOLIC BLOOD PRESSURE: 68 MMHG | HEIGHT: 63 IN | HEART RATE: 82 BPM | WEIGHT: 110 LBS

## 2025-07-24 DIAGNOSIS — Z00.00 MEDICARE ANNUAL WELLNESS VISIT, SUBSEQUENT: Primary | ICD-10-CM

## 2025-07-24 DIAGNOSIS — F02.A4 MILD DEMENTIA ASSOCIATED WITH OTHER UNDERLYING DISEASE, WITH ANXIETY: ICD-10-CM

## 2025-07-24 DIAGNOSIS — M85.80 OSTEOPENIA, UNSPECIFIED LOCATION: ICD-10-CM

## 2025-07-24 NOTE — PROGRESS NOTES
Patient ID: Collette Lagunas is a 70 y.o. female.    Chief Complaint: Medicare AWV (Wellness. Discuss labs (drawn 07/22/2025))      Patient Care Team:  Joel Roberts MD as PCP - General (Internal Medicine)     HPI:   Patient presents here today for above reason.     Patient is without any major complaints or concerns at this time.     The patient's Health Maintenance was reviewed and the following appears to be due at this time:   Health Maintenance Due   Topic Date Due    Hepatitis C Screening  Never done    RSV Vaccine (Age 60+ and Pregnant patients) (1 - Risk 60-74 years 1-dose series) Never done    Colorectal Cancer Screening  10/13/2025        Past Medical History:  Past Medical History:   Diagnosis Date    Degenerative joint disease of knee     Fatigue     HTN (hypertension)     Hypothyroidism, unspecified     Osteopenia     Personal history of colonic polyps     Vitamin B deficiency      Past Surgical History:   Procedure Laterality Date    BLEPHAROPLASTY, LOWER EYELID Bilateral 11/1/2024    Procedure: BLEPHAROPLASTY, LOWER EYELID;  Surgeon: Nathan Groves MD;  Location: [address];  Service: ENT;  Laterality: Bilateral;    BLEPHAROPLASTY, UPPER EYELID Bilateral 11/1/2024    Procedure: BLEPHAROPLASTY, UPPER EYELID;  Surgeon: Nathan Groves MD;  Location: [address] OR;  Service: ENT;  Laterality: Bilateral;    BROW LIFT N/A 11/1/2024    Procedure: All Cosmetic- Endoscopic Browlift with Jocy, Bilateral Upper Blephs, Bilateral Lower Blephs - *Endotines Required*;  Surgeon: Nathan Groves MD;  Location: [address] OR;  Service: ENT;  Laterality: N/A;    COLONOSCOPY  10/13/2020    Dr Roni Brown    EYE SURGERY Bilateral 2004    Lasik surgery    JOINT REPLACEMENT Bilateral February 2021    Full knee replacement     Review of patient's allergies indicates:   Allergen Reactions    Sulfa (sulfonamide antibiotics)      Other reaction(s): Unknown     Medications Ordered Prior to  "Encounter[1]  Social History[2]  Family History   Problem Relation Name Age of Onset    Diabetes type II Father      Cancer Mother Collette Cooper         Breast Cancer    Hypertension Mother Collette Cooper     Cancer Son Joseph Lagunas         Ewings Sarcoma-skull                           Opioid Screening: Patient medication list reviewed, patient is not taking prescription opioids. Patient is not using additional opioids than prescribed. Patient is at low risk of substance abuse based on this opioid use history.      ROS:   Negative other than what is mentioned in HPI    Vitals/PE:   /68 (BP Location: Left arm, Patient Position: Sitting)   Pulse 82   Ht 5' 3" (1.6 m)   Wt 49.9 kg (110 lb)   SpO2 98%   BMI 19.49 kg/m²   Physical Exam    General: Alert and oriented, No acute distress.   Eye: Normal conjunctiva without exudate.  HENMT: Normocephalic/AT, Normal hearing, Oral mucosa is moist and pink   Neck: No goiter visualized.   Respiratory: Lungs CTAB, Respirations are non-labored, Breath sounds are equal, Symmetrical chest wall expansion.  Cardiovascular: Normal rate, Regular rhythm, No murmur, No edema.   Gastrointestinal: Non-distended.   Genitourinary: Deferred.  Musculoskeletal: Normal ROM, Normal gait, No deformities or amputations.  Integumentary: Warm, Dry, Intact. No diaphoresis, or flushing.  Neurologic: No focal deficits, Cranial Nerves II-XII are grossly intact.   Psychiatric: Cooperative, Appropriate mood & affect, Normal judgment, Non-suicidal.           No data to display                  7/24/2025     4:00 PM 5/5/2025    11:00 AM 2/17/2025     2:30 PM 1/27/2025     1:00 PM 10/17/2024     8:00 AM 7/23/2024    10:00 AM 1/17/2024     2:00 PM   Fall Risk Assessment - Outpatient   Mobility Status Ambulatory Ambulatory Ambulatory Ambulatory Ambulatory Ambulatory Ambulatory   Number of falls 0 0 0 0 0 0 0   Identified as fall risk False False False False False False False            "     Assessment/Plan:    1. Medicare annual wellness visit, subsequent  Comments:  all labs  discussed all OK    2. Mild dementia associated with other underlying disease, with anxiety  Comments:  with neurologist    3. Osteopenia, unspecified location  Comments:  BDT  reviewed   continue  low impact exercises   Caltrate        ..      ..No orders of the defined types were placed in this encounter.        I am having Cira Lagunas maintain her HAIR-SKIN-NAILS (MV-FA-BIOTIN), losartan, (cyanocobalamin, vitamin B-12, (VITAMIN B-12 INJ)), levothyroxine, semaglutide, and sertraline.    No orders of the defined types were placed in this encounter.      Education and counseling done face to face regarding medical conditions and plan. Contact office if new symptoms develop. Should any symptoms ever significantly worsen seek emergency medical attention/go to ER. Follow up at least yearly for wellness or sooner PRN. Nurse to call patient with any results. The patient is receptive, expresses understanding and is agreeable to plan. All questions have been answered.    Advance Care Planning     Date: 07/24/2025  Patient did not wish or was not able to name a surrogate decision maker or provide an Advance Care Plan.            Follow up in about 6 months (around 1/24/2026).             [1]   Current Outpatient Medications on File Prior to Visit   Medication Sig Dispense Refill    cyanocobalamin, vitamin B-12, (VITAMIN B-12 INJ) Inject as directed.      levothyroxine (SYNTHROID) 25 MCG tablet TAKE 1 TABLET BY MOUTH EVERY DAY 90 tablet 2    losartan (COZAAR) 25 MG tablet TAKE 1 TABLET BY MOUTH EVERY DAY 90 tablet 3    multivitamin-folic acid-biotin (HAIR-SKIN-NAILS, MV-FA-BIOTIN,) 400-2,000 mcg Tab Take by mouth.      semaglutide (OZEMPIC) 2 mg/dose (8 mg/3 mL) PnIj Inject 2 mg into the skin every 7 days. 1 each 6    sertraline (ZOLOFT) 50 MG tablet take 1/2 tab daily      [DISCONTINUED] SEMAGLUTIDE ORAL once daily. (Patient  not taking: Reported on 5/5/2025)       No current facility-administered medications on file prior to visit.   [2]   Social History  Socioeconomic History    Marital status:    Tobacco Use    Smoking status: Never    Smokeless tobacco: Never   Substance and Sexual Activity    Alcohol use: Not Currently     Comment: occ    Drug use: Never    Sexual activity: Yes     Partners: Male     Birth control/protection: None     Social Drivers of Health     Financial Resource Strain: Low Risk  (7/22/2025)    Overall Financial Resource Strain (CARDIA)     Difficulty of Paying Living Expenses: Not hard at all   Food Insecurity: No Food Insecurity (7/22/2025)    Hunger Vital Sign     Worried About Running Out of Food in the Last Year: Never true     Ran Out of Food in the Last Year: Never true   Transportation Needs: No Transportation Needs (7/22/2025)    PRAPARE - Transportation     Lack of Transportation (Medical): No     Lack of Transportation (Non-Medical): No   Physical Activity: Insufficiently Active (7/22/2025)    Exercise Vital Sign     Days of Exercise per Week: 4 days     Minutes of Exercise per Session: 30 min   Stress: Stress Concern Present (7/22/2025)    [employer] - Occupational Stress Questionnaire     Feeling of Stress : To some extent   Housing Stability: Low Risk  (7/22/2025)    Housing Stability Vital Sign     Unable to Pay for Housing in the Last Year: No     Number of Times Moved in the Last Year: 0     Homeless in the Last Year: No

## 2025-08-11 ENCOUNTER — PATIENT MESSAGE (OUTPATIENT)
Dept: INTERNAL MEDICINE | Facility: CLINIC | Age: 70
End: 2025-08-11
Payer: MEDICARE

## 2025-08-23 ENCOUNTER — PATIENT MESSAGE (OUTPATIENT)
Dept: RESEARCH | Facility: HOSPITAL | Age: 70
End: 2025-08-23
Payer: MEDICARE

## 2025-09-02 DIAGNOSIS — I10 HYPERTENSION, UNSPECIFIED TYPE: ICD-10-CM

## 2025-09-02 RX ORDER — LOSARTAN POTASSIUM 25 MG/1
25 TABLET ORAL DAILY
Qty: 90 TABLET | Refills: 3 | Status: SHIPPED | OUTPATIENT
Start: 2025-09-02 | End: 2025-09-05 | Stop reason: SDUPTHER

## 2025-09-05 DIAGNOSIS — I10 HYPERTENSION, UNSPECIFIED TYPE: ICD-10-CM

## 2025-09-05 RX ORDER — LOSARTAN POTASSIUM 25 MG/1
25 TABLET ORAL DAILY
Qty: 90 TABLET | Refills: 3 | Status: SHIPPED | OUTPATIENT
Start: 2025-09-05

## (undated) DEVICE — BASIN EMESIS 700 ML

## (undated) DEVICE — SOL NACL IRR 1000ML BTL

## (undated) DEVICE — SPONGE COTTON TRAY 4X4IN

## (undated) DEVICE — BLADE SURG STAINLESS STEEL #15

## (undated) DEVICE — STAPLER SKIN PROXIMATE WIDE

## (undated) DEVICE — SPONGE LAP 18X18 PREWASHED

## (undated) DEVICE — SUT VICRYL 3-0 27 SH

## (undated) DEVICE — NDL N SERIES MICRO-DISSECTION

## (undated) DEVICE — BLADE SURG STAINLESS STEEL #11

## (undated) DEVICE — SUT VICRYL PLUS 5-0 P3 18IN

## (undated) DEVICE — PROTECTOR CORNEAL CROUCH ST

## (undated) DEVICE — Device

## (undated) DEVICE — GLOVE SIGNATURE MICRO LTX 7.5

## (undated) DEVICE — CATH SUCTION W/O STR CONN 10FR

## (undated) DEVICE — ELECTRODE PATIENT RETURN DISP

## (undated) DEVICE — SOL IRRI STRL WATER 1000ML

## (undated) DEVICE — SOL NORMAL USPCA 0.9%

## (undated) DEVICE — APPLICATOR STRL COT 2INNR 6IN

## (undated) DEVICE — CONTAINER SPECIMEN SCREW 4OZ

## (undated) DEVICE — KNIFE OPHTH STR SIDEPORT 15DEG

## (undated) DEVICE — BANDAGE GAUZE COT STRL 4.5X4.1

## (undated) DEVICE — SUT PDS PLUS 2-0 SH 27IN

## (undated) DEVICE — SPONGE GAUZE 16PLY 4X4

## (undated) DEVICE — CLOSURE SKIN STERI STRIP 1/2X4

## (undated) DEVICE — COVER PROXIMA MAYO STAND

## (undated) DEVICE — SUT SILK 5-0 BLK P-3

## (undated) DEVICE — NDL HYPO REG 25G X 1 1/2

## (undated) DEVICE — EXTRACTOR STAPLE SQZ HANDLE

## (undated) DEVICE — SUT 6/0 18IN PLAIN GUT G-1

## (undated) DEVICE — KIT ANTIFOG W/SPONG & FLUID

## (undated) DEVICE — SEE MEDLINE ITEM 152529

## (undated) DEVICE — SYR B-D DISP CONTROL 10CC100/C

## (undated) DEVICE — SUT ETHILON BL MONO P3

## (undated) DEVICE — SUT 5/0 18IN PDS II CLR MO

## (undated) DEVICE — SUT 4-0 MERSILENE P-3

## (undated) DEVICE — SEALANT VISTASEAL FIBRIN 4ML

## (undated) DEVICE — ADHESIVE DERMABOND ADVANCED

## (undated) DEVICE — POSITIONER HEAD ADULT

## (undated) DEVICE — SKINMARKER & RULER REGULAR X-F

## (undated) DEVICE — GAUZE CNFRM STRTCH STRL 4X75IN

## (undated) DEVICE — NDL HYPO STD REG BVL 30G 0.5IN

## (undated) DEVICE — SUT 6-0 VICRYL

## (undated) DEVICE — GLOVE SIGNATURE MICRO LTX 6.5

## (undated) DEVICE — PENCIL ELECSURG ROCKER 15FT

## (undated) DEVICE — ELECTRODE BLADE INSULATED 1 IN

## (undated) DEVICE — BLANKET WARMING LOWER BODY

## (undated) DEVICE — SUPPORT ULNA NERVE PROTECTOR